# Patient Record
Sex: MALE | Race: WHITE | NOT HISPANIC OR LATINO | Employment: FULL TIME | URBAN - METROPOLITAN AREA
[De-identification: names, ages, dates, MRNs, and addresses within clinical notes are randomized per-mention and may not be internally consistent; named-entity substitution may affect disease eponyms.]

---

## 2020-08-20 ENCOUNTER — OFFICE VISIT (OUTPATIENT)
Dept: FAMILY MEDICINE | Facility: CLINIC | Age: 40
End: 2020-08-20
Payer: COMMERCIAL

## 2020-08-20 VITALS
BODY MASS INDEX: 32.41 KG/M2 | OXYGEN SATURATION: 98 % | HEIGHT: 71 IN | TEMPERATURE: 98 F | DIASTOLIC BLOOD PRESSURE: 91 MMHG | HEART RATE: 82 BPM | WEIGHT: 231.5 LBS | SYSTOLIC BLOOD PRESSURE: 126 MMHG

## 2020-08-20 DIAGNOSIS — Z00.01 ANNUAL VISIT FOR GENERAL ADULT MEDICAL EXAMINATION WITH ABNORMAL FINDINGS: ICD-10-CM

## 2020-08-20 DIAGNOSIS — B35.3 TINEA PEDIS OF BOTH FEET: ICD-10-CM

## 2020-08-20 DIAGNOSIS — Z00.00 WELLNESS EXAMINATION: Primary | ICD-10-CM

## 2020-08-20 DIAGNOSIS — Z79.899 ENCOUNTER FOR LONG-TERM CURRENT USE OF MEDICATION: ICD-10-CM

## 2020-08-20 DIAGNOSIS — R53.83 FATIGUE, UNSPECIFIED TYPE: ICD-10-CM

## 2020-08-20 DIAGNOSIS — M25.50 ARTHRALGIA, UNSPECIFIED JOINT: ICD-10-CM

## 2020-08-20 DIAGNOSIS — R73.03 BORDERLINE DIABETES: ICD-10-CM

## 2020-08-20 DIAGNOSIS — Z00.00 ENCOUNTER FOR MEDICAL EXAMINATION TO ESTABLISH CARE: ICD-10-CM

## 2020-08-20 PROCEDURE — 3008F PR BODY MASS INDEX (BMI) DOCUMENTED: ICD-10-PCS | Mod: S$GLB,,, | Performed by: FAMILY MEDICINE

## 2020-08-20 PROCEDURE — 3008F BODY MASS INDEX DOCD: CPT | Mod: S$GLB,,, | Performed by: FAMILY MEDICINE

## 2020-08-20 PROCEDURE — 99386 PR PREVENTIVE VISIT,NEW,40-64: ICD-10-PCS | Mod: S$GLB,,, | Performed by: FAMILY MEDICINE

## 2020-08-20 PROCEDURE — 99999 PR PBB SHADOW E&M-NEW PATIENT-LVL IV: CPT | Mod: PBBFAC,,, | Performed by: FAMILY MEDICINE

## 2020-08-20 PROCEDURE — 99999 PR PBB SHADOW E&M-NEW PATIENT-LVL IV: ICD-10-PCS | Mod: PBBFAC,,, | Performed by: FAMILY MEDICINE

## 2020-08-20 PROCEDURE — 99386 PREV VISIT NEW AGE 40-64: CPT | Mod: S$GLB,,, | Performed by: FAMILY MEDICINE

## 2020-08-20 RX ORDER — ESCITALOPRAM OXALATE 10 MG/1
TABLET ORAL
COMMUNITY
Start: 2020-05-20 | End: 2020-11-19 | Stop reason: SDUPTHER

## 2020-08-20 RX ORDER — LOSARTAN POTASSIUM 50 MG/1
TABLET ORAL
COMMUNITY
Start: 2020-07-21 | End: 2020-08-25 | Stop reason: SDUPTHER

## 2020-08-20 RX ORDER — ALLOPURINOL 300 MG/1
TABLET ORAL
COMMUNITY
Start: 2020-07-21 | End: 2020-08-25 | Stop reason: SDUPTHER

## 2020-08-20 RX ORDER — DULAGLUTIDE 1.5 MG/.5ML
INJECTION, SOLUTION SUBCUTANEOUS
COMMUNITY
Start: 2020-07-22 | End: 2020-08-25 | Stop reason: SDUPTHER

## 2020-08-20 RX ORDER — PROPRANOLOL HYDROCHLORIDE 10 MG/1
TABLET ORAL
COMMUNITY
Start: 2020-05-20

## 2020-08-20 RX ORDER — ROSUVASTATIN CALCIUM 20 MG/1
TABLET, COATED ORAL
COMMUNITY
Start: 2020-07-21 | End: 2020-08-25 | Stop reason: SDUPTHER

## 2020-08-20 RX ORDER — INDOMETHACIN 50 MG/1
CAPSULE ORAL
COMMUNITY
End: 2020-12-30 | Stop reason: SDUPTHER

## 2020-08-20 RX ORDER — COLCHICINE 0.6 MG/1
CAPSULE ORAL
COMMUNITY
End: 2020-12-30 | Stop reason: SDUPTHER

## 2020-08-20 RX ORDER — METFORMIN HYDROCHLORIDE 500 MG/1
TABLET, EXTENDED RELEASE ORAL
COMMUNITY
Start: 2020-07-21 | End: 2020-08-25 | Stop reason: SDUPTHER

## 2020-08-25 ENCOUNTER — LAB VISIT (OUTPATIENT)
Dept: FAMILY MEDICINE | Facility: CLINIC | Age: 40
End: 2020-08-25
Payer: COMMERCIAL

## 2020-08-25 ENCOUNTER — APPOINTMENT (OUTPATIENT)
Dept: LAB | Facility: HOSPITAL | Age: 40
End: 2020-08-25
Attending: FAMILY MEDICINE
Payer: COMMERCIAL

## 2020-08-25 DIAGNOSIS — R73.03 BORDERLINE DIABETES: ICD-10-CM

## 2020-08-25 DIAGNOSIS — R53.83 FATIGUE, UNSPECIFIED TYPE: ICD-10-CM

## 2020-08-25 DIAGNOSIS — M25.50 ARTHRALGIA, UNSPECIFIED JOINT: ICD-10-CM

## 2020-08-25 DIAGNOSIS — Z00.00 WELLNESS EXAMINATION: ICD-10-CM

## 2020-08-25 DIAGNOSIS — Z79.899 ENCOUNTER FOR LONG-TERM CURRENT USE OF MEDICATION: ICD-10-CM

## 2020-08-25 LAB
25(OH)D3+25(OH)D2 SERPL-MCNC: 35 NG/ML (ref 30–96)
ALBUMIN SERPL BCP-MCNC: 4.6 G/DL (ref 3.5–5.2)
ALP SERPL-CCNC: 74 U/L (ref 55–135)
ALT SERPL W/O P-5'-P-CCNC: 77 U/L (ref 10–44)
ANION GAP SERPL CALC-SCNC: 11 MMOL/L (ref 8–16)
AST SERPL-CCNC: 36 U/L (ref 10–40)
BASOPHILS # BLD AUTO: 0.05 K/UL (ref 0–0.2)
BASOPHILS NFR BLD: 0.8 % (ref 0–1.9)
BILIRUB SERPL-MCNC: 0.8 MG/DL (ref 0.1–1)
BUN SERPL-MCNC: 14 MG/DL (ref 6–20)
CALCIUM SERPL-MCNC: 9.1 MG/DL (ref 8.7–10.5)
CHLORIDE SERPL-SCNC: 101 MMOL/L (ref 95–110)
CHOLEST SERPL-MCNC: 163 MG/DL (ref 120–199)
CHOLEST/HDLC SERPL: 4.4 {RATIO} (ref 2–5)
CO2 SERPL-SCNC: 24 MMOL/L (ref 23–29)
CREAT SERPL-MCNC: 1 MG/DL (ref 0.5–1.4)
DIFFERENTIAL METHOD: NORMAL
EOSINOPHIL # BLD AUTO: 0.1 K/UL (ref 0–0.5)
EOSINOPHIL NFR BLD: 2.4 % (ref 0–8)
ERYTHROCYTE [DISTWIDTH] IN BLOOD BY AUTOMATED COUNT: 12.3 % (ref 11.5–14.5)
ERYTHROCYTE [SEDIMENTATION RATE] IN BLOOD BY WESTERGREN METHOD: 5 MM/HR (ref 0–10)
EST. GFR  (AFRICAN AMERICAN): >60 ML/MIN/1.73 M^2
EST. GFR  (NON AFRICAN AMERICAN): >60 ML/MIN/1.73 M^2
ESTIMATED AVG GLUCOSE: 151 MG/DL (ref 68–131)
GLUCOSE SERPL-MCNC: 147 MG/DL (ref 70–110)
HBA1C MFR BLD HPLC: 6.9 % (ref 4.5–6.2)
HCT VFR BLD AUTO: 46.8 % (ref 40–54)
HDLC SERPL-MCNC: 37 MG/DL (ref 40–75)
HDLC SERPL: 22.7 % (ref 20–50)
HGB BLD-MCNC: 16 G/DL (ref 14–18)
IMM GRANULOCYTES # BLD AUTO: 0.02 K/UL (ref 0–0.04)
IMM GRANULOCYTES NFR BLD AUTO: 0.3 % (ref 0–0.5)
LDLC SERPL CALC-MCNC: 89 MG/DL (ref 63–159)
LYMPHOCYTES # BLD AUTO: 1.8 K/UL (ref 1–4.8)
LYMPHOCYTES NFR BLD: 30 % (ref 18–48)
MCH RBC QN AUTO: 29.4 PG (ref 27–31)
MCHC RBC AUTO-ENTMCNC: 34.2 G/DL (ref 32–36)
MCV RBC AUTO: 86 FL (ref 82–98)
MONOCYTES # BLD AUTO: 0.4 K/UL (ref 0.3–1)
MONOCYTES NFR BLD: 7.3 % (ref 4–15)
NEUTROPHILS # BLD AUTO: 3.5 K/UL (ref 1.8–7.7)
NEUTROPHILS NFR BLD: 59.2 % (ref 38–73)
NONHDLC SERPL-MCNC: 126 MG/DL
NRBC BLD-RTO: 0 /100 WBC
PLATELET # BLD AUTO: 287 K/UL (ref 150–350)
PMV BLD AUTO: 10.2 FL (ref 9.2–12.9)
POTASSIUM SERPL-SCNC: 4.4 MMOL/L (ref 3.5–5.1)
PROT SERPL-MCNC: 7.8 G/DL (ref 6–8.4)
RBC # BLD AUTO: 5.44 M/UL (ref 4.6–6.2)
SODIUM SERPL-SCNC: 136 MMOL/L (ref 136–145)
T4 FREE SERPL-MCNC: 0.68 NG/DL (ref 0.71–1.51)
TRIGL SERPL-MCNC: 185 MG/DL (ref 30–150)
TSH SERPL DL<=0.005 MIU/L-ACNC: 1.1 UIU/ML (ref 0.34–5.6)
URATE SERPL-MCNC: 8.4 MG/DL (ref 3.4–7)
VIT B12 SERPL-MCNC: 497 PG/ML (ref 210–950)
WBC # BLD AUTO: 5.93 K/UL (ref 3.9–12.7)

## 2020-08-25 PROCEDURE — 86235 NUCLEAR ANTIGEN ANTIBODY: CPT | Mod: 59

## 2020-08-25 PROCEDURE — 86038 ANTINUCLEAR ANTIBODIES: CPT

## 2020-08-25 PROCEDURE — 82607 VITAMIN B-12: CPT

## 2020-08-25 PROCEDURE — 82306 VITAMIN D 25 HYDROXY: CPT

## 2020-08-25 PROCEDURE — 84443 ASSAY THYROID STIM HORMONE: CPT

## 2020-08-25 PROCEDURE — 86431 RHEUMATOID FACTOR QUANT: CPT

## 2020-08-25 PROCEDURE — 80053 COMPREHEN METABOLIC PANEL: CPT

## 2020-08-25 PROCEDURE — 84439 ASSAY OF FREE THYROXINE: CPT

## 2020-08-25 PROCEDURE — 80061 LIPID PANEL: CPT

## 2020-08-25 PROCEDURE — 84550 ASSAY OF BLOOD/URIC ACID: CPT

## 2020-08-25 PROCEDURE — 83036 HEMOGLOBIN GLYCOSYLATED A1C: CPT

## 2020-08-25 PROCEDURE — 86039 ANTINUCLEAR ANTIBODIES (ANA): CPT

## 2020-08-25 PROCEDURE — 85651 RBC SED RATE NONAUTOMATED: CPT

## 2020-08-25 PROCEDURE — 85025 COMPLETE CBC W/AUTO DIFF WBC: CPT

## 2020-08-25 RX ORDER — LOSARTAN POTASSIUM 50 MG/1
TABLET ORAL
Qty: 90 TABLET | Refills: 3 | Status: SHIPPED | OUTPATIENT
Start: 2020-08-25 | End: 2021-04-29 | Stop reason: SDUPTHER

## 2020-08-25 RX ORDER — DULAGLUTIDE 1.5 MG/.5ML
1.5 INJECTION, SOLUTION SUBCUTANEOUS WEEKLY
Qty: 12 PEN | Refills: 3 | Status: SHIPPED | OUTPATIENT
Start: 2020-08-25 | End: 2021-07-21

## 2020-08-25 RX ORDER — METFORMIN HYDROCHLORIDE 500 MG/1
TABLET, EXTENDED RELEASE ORAL
Qty: 180 TABLET | Refills: 3 | Status: SHIPPED | OUTPATIENT
Start: 2020-08-25 | End: 2021-07-21 | Stop reason: SDUPTHER

## 2020-08-25 RX ORDER — ALLOPURINOL 300 MG/1
TABLET ORAL
Qty: 90 TABLET | Refills: 3 | Status: SHIPPED | OUTPATIENT
Start: 2020-08-25 | End: 2021-04-29 | Stop reason: SDUPTHER

## 2020-08-25 RX ORDER — ROSUVASTATIN CALCIUM 20 MG/1
TABLET, COATED ORAL
Qty: 90 TABLET | Refills: 3 | Status: SHIPPED | OUTPATIENT
Start: 2020-08-25 | End: 2021-04-29 | Stop reason: SDUPTHER

## 2020-08-26 LAB
ANA PATTERN 1: NORMAL
ANA SER QL IF: POSITIVE
ANA TITR SER IF: NORMAL {TITER}
RHEUMATOID FACT SERPL-ACNC: 12 IU/ML (ref 0–15)

## 2020-08-26 RX ORDER — CICLOPIROX OLAMINE 7.7 MG/G
CREAM TOPICAL 2 TIMES DAILY
Qty: 30 G | Refills: 2 | Status: SHIPPED | OUTPATIENT
Start: 2020-08-26 | End: 2021-07-21

## 2020-08-27 ENCOUNTER — OFFICE VISIT (OUTPATIENT)
Dept: FAMILY MEDICINE | Facility: CLINIC | Age: 40
End: 2020-08-27
Payer: COMMERCIAL

## 2020-08-27 VITALS
WEIGHT: 231 LBS | TEMPERATURE: 99 F | HEIGHT: 71 IN | DIASTOLIC BLOOD PRESSURE: 82 MMHG | HEART RATE: 90 BPM | OXYGEN SATURATION: 97 % | SYSTOLIC BLOOD PRESSURE: 122 MMHG | BODY MASS INDEX: 32.34 KG/M2 | RESPIRATION RATE: 16 BRPM

## 2020-08-27 DIAGNOSIS — R76.8 ANA POSITIVE: ICD-10-CM

## 2020-08-27 DIAGNOSIS — E11.9 DIABETES MELLITUS WITHOUT COMPLICATION: Primary | ICD-10-CM

## 2020-08-27 DIAGNOSIS — R53.83 FATIGUE, UNSPECIFIED TYPE: ICD-10-CM

## 2020-08-27 DIAGNOSIS — M10.9 GOUTY ARTHRITIS: ICD-10-CM

## 2020-08-27 DIAGNOSIS — E66.9 OBESITY (BMI 30.0-34.9): ICD-10-CM

## 2020-08-27 DIAGNOSIS — E55.9 VITAMIN D DEFICIENCY: ICD-10-CM

## 2020-08-27 DIAGNOSIS — M25.50 ARTHRALGIA, UNSPECIFIED JOINT: ICD-10-CM

## 2020-08-27 DIAGNOSIS — M11.20 PSEUDOGOUT: ICD-10-CM

## 2020-08-27 PROCEDURE — 3044F HG A1C LEVEL LT 7.0%: CPT | Mod: S$GLB,,, | Performed by: FAMILY MEDICINE

## 2020-08-27 PROCEDURE — 3044F PR MOST RECENT HEMOGLOBIN A1C LEVEL <7.0%: ICD-10-PCS | Mod: S$GLB,,, | Performed by: FAMILY MEDICINE

## 2020-08-27 PROCEDURE — 99999 PR PBB SHADOW E&M-EST. PATIENT-LVL IV: ICD-10-PCS | Mod: PBBFAC,,, | Performed by: FAMILY MEDICINE

## 2020-08-27 PROCEDURE — 3008F PR BODY MASS INDEX (BMI) DOCUMENTED: ICD-10-PCS | Mod: S$GLB,,, | Performed by: FAMILY MEDICINE

## 2020-08-27 PROCEDURE — 99999 PR PBB SHADOW E&M-EST. PATIENT-LVL IV: CPT | Mod: PBBFAC,,, | Performed by: FAMILY MEDICINE

## 2020-08-27 PROCEDURE — 96372 PR INJECTION,THERAP/PROPH/DIAG2ST, IM OR SUBCUT: ICD-10-PCS | Mod: S$GLB,,, | Performed by: FAMILY MEDICINE

## 2020-08-27 PROCEDURE — 99215 OFFICE O/P EST HI 40 MIN: CPT | Mod: 25,S$GLB,, | Performed by: FAMILY MEDICINE

## 2020-08-27 PROCEDURE — 99215 PR OFFICE/OUTPT VISIT, EST, LEVL V, 40-54 MIN: ICD-10-PCS | Mod: 25,S$GLB,, | Performed by: FAMILY MEDICINE

## 2020-08-27 PROCEDURE — 96372 THER/PROPH/DIAG INJ SC/IM: CPT | Mod: S$GLB,,, | Performed by: FAMILY MEDICINE

## 2020-08-27 PROCEDURE — 3008F BODY MASS INDEX DOCD: CPT | Mod: S$GLB,,, | Performed by: FAMILY MEDICINE

## 2020-08-27 RX ORDER — ASPIRIN 325 MG
50000 TABLET, DELAYED RELEASE (ENTERIC COATED) ORAL WEEKLY
Qty: 12 CAPSULE | Refills: 0 | Status: SHIPPED | OUTPATIENT
Start: 2020-08-27 | End: 2020-11-13

## 2020-08-27 RX ORDER — CYANOCOBALAMIN 1000 UG/ML
100 INJECTION, SOLUTION INTRAMUSCULAR; SUBCUTANEOUS
Status: COMPLETED | OUTPATIENT
Start: 2020-08-27 | End: 2020-08-27

## 2020-08-27 RX ADMIN — CYANOCOBALAMIN 100 MCG: 1000 INJECTION, SOLUTION INTRAMUSCULAR; SUBCUTANEOUS at 05:08

## 2020-08-27 NOTE — PROGRESS NOTES
Subjective:         Patient ID: Butch Meneses is a 40 y.o. male.    Chief Complaint: Follow-up      Here for follow up on lab results.    ESR 5  FANNY screen +  FANNY Titer 1 1:160  FANNY Pattern 1 Homogenous  Rh factor 12 (wnl)  B12 497  Uric acid 8.4 (high)  Vit D 35  TSH 1.100  F T4 0.68 (low)    Due for UA and urine microalb/creatinine and FIT test.        CBC:   Lab Results   Component Value Date    WBC 5.93 08/25/2020    HGB 16.0 08/25/2020    HCT 46.8 08/25/2020     08/25/2020    MCV 86 08/25/2020    RDW 12.3 08/25/2020     CMP:   Lab Results   Component Value Date     08/25/2020    K 4.4 08/25/2020     08/25/2020    CO2 24 08/25/2020    BUN 14 08/25/2020    CREATININE 1.0 08/25/2020     (H) 08/25/2020    CALCIUM 9.1 08/25/2020    ALKPHOS 74 08/25/2020    PROT 7.8 08/25/2020    ALBUMIN 4.6 08/25/2020    BILITOT 0.8 08/25/2020    AST 36 08/25/2020    ALT 77 (H) 08/25/2020     LIPIDS:   Lab Results   Component Value Date    CHOL 163 08/25/2020    HDL 37 (L) 08/25/2020    LDLCALC 89.0 08/25/2020    TRIG 185 (H) 08/25/2020    CHOLHDL 22.7 08/25/2020     HA1C:   Lab Results   Component Value Date    HGBA1C 6.9 (H) 08/25/2020     TSH:   Lab Results   Component Value Date    TSH 1.100 08/25/2020       Review of Systems   Constitutional: Negative for activity change, appetite change, fatigue, fever and unexpected weight change.   HENT: Negative for congestion, hearing loss, rhinorrhea, sinus pressure and trouble swallowing.    Eyes: Negative for discharge and visual disturbance.   Respiratory: Negative for cough, chest tightness, shortness of breath and wheezing.    Cardiovascular: Negative for chest pain and palpitations.   Gastrointestinal: Negative for abdominal pain, blood in stool, constipation, diarrhea, nausea and vomiting.   Endocrine: Negative for polydipsia, polyphagia and polyuria.   Genitourinary: Negative for difficulty urinating, dysuria, frequency, hematuria and urgency.    Musculoskeletal: Negative for arthralgias, joint swelling, myalgias and neck pain.   Skin: Negative for rash.   Allergic/Immunologic: Negative for environmental allergies.   Neurological: Negative for dizziness, weakness and headaches.   Hematological: Negative for adenopathy.   Psychiatric/Behavioral: Negative for confusion, dysphoric mood and sleep disturbance.   All other systems reviewed and are negative.        Past Medical History:   Diagnosis Date    Anxiety     Arthritis     Depression     Diabetes mellitus, type 2     pt takes meds. sugar is borderline    Gout     Hyperlipidemia     Pseudogout      Past Surgical History:   Procedure Laterality Date    achillies      rupture     Family History   Problem Relation Age of Onset    Alcohol abuse Father     Cancer Father     Depression Father     Diabetes Father     Drug abuse Father     Heart disease Father     Hyperlipidemia Father     Hypertension Father     Mental illness Father     Arthritis Sister     Depression Sister     Drug abuse Sister     Hyperlipidemia Sister     Hypertension Sister     Mental illness Sister     Alcohol abuse Maternal Aunt     Drug abuse Maternal Aunt     Alcohol abuse Maternal Uncle     Drug abuse Maternal Uncle     Hyperlipidemia Maternal Uncle     Hypertension Maternal Uncle     Cancer Maternal Uncle         kidney cancer    Hyperlipidemia Paternal Uncle     Drug abuse Maternal Grandfather     Early death Maternal Grandfather 64    Heart disease Maternal Grandfather     Hypertension Maternal Grandfather     Arthritis Paternal Grandmother     Early death Paternal Grandmother 63    Stroke Paternal Grandmother     Pulmonary embolism Paternal Grandmother     Arthritis Paternal Grandfather     Cancer Paternal Grandfather         kidney cancer    Kidney disease Paternal Grandfather        Review of patient's allergies indicates:   Allergen Reactions    Vicryl [sutures, polyglycolic acid]  Other (See Comments)     Body rejected vicryl sutures due to allergy--had wound dehiscence       Current Outpatient Medications:     allopurinoL (ZYLOPRIM) 300 MG tablet, Take 1 tablet PO daily for gout prevention., Disp: 90 tablet, Rfl: 3    cholecalciferol, vitamin D3, (DECARA) 1,250 mcg (50,000 unit) capsule, Take 1 capsule (50,000 Units total) by mouth once a week. Take with a meal or with a spoonful of peanut butter for low vitamin D. for 12 doses, Disp: 12 capsule, Rfl: 0    ciclopirox (LOPROX) 0.77 % Crea, Apply topically 2 (two) times daily. For athlete's feet. for 14 days, Disp: 30 g, Rfl: 2    colchicine (MITIGARE) 0.6 mg Cap, Take by mouth., Disp: , Rfl:     escitalopram oxalate (LEXAPRO) 10 MG tablet, TK 1 T PO BID, Disp: , Rfl:     indomethacin (INDOCIN) 50 MG capsule, Take by mouth., Disp: , Rfl:     losartan (COZAAR) 50 MG tablet, Take 1 ablet PO daily  TO PROTECT KIDNEYS, Disp: 90 tablet, Rfl: 3    metFORMIN (GLUCOPHAGE-XR) 500 MG ER 24hr tablet, Take 2 tablets PO daily with a meal  FOR DIABETES, Disp: 180 tablet, Rfl: 3    propranoloL (INDERAL) 10 MG tablet, TK 1 T PO BID, Disp: , Rfl:     rosuvastatin (CRESTOR) 20 MG tablet, Take 1 tablet PO daily  FOR CHOLESTEROL, Disp: 90 tablet, Rfl: 3    TRULICITY 1.5 mg/0.5 mL pen injector, Inject 1.5 mg into the skin once a week., Disp: 12 pen, Rfl: 3      Objective:      Temp 98.6 °F (37 °C)   Wt 104.8 kg (231 lb)   BMI 32.22 kg/m²   Physical Exam  Vitals signs and nursing note reviewed.   Constitutional:       General: He is not in acute distress.     Appearance: Normal appearance. He is well-developed. He is not ill-appearing, toxic-appearing or diaphoretic.   HENT:      Head: Normocephalic and atraumatic.      Right Ear: External ear normal.      Left Ear: External ear normal.      Nose: Nose normal.      Mouth/Throat:      Pharynx: No oropharyngeal exudate.   Eyes:      General: No scleral icterus.        Right eye: No discharge.          Left eye: No discharge.      Extraocular Movements: Extraocular movements intact.      Conjunctiva/sclera: Conjunctivae normal.      Pupils: Pupils are equal, round, and reactive to light.   Neck:      Musculoskeletal: Normal range of motion and neck supple.      Thyroid: No thyromegaly.      Vascular: No JVD.      Trachea: No tracheal deviation.   Cardiovascular:      Rate and Rhythm: Normal rate and regular rhythm.      Pulses: Normal pulses.      Heart sounds: Normal heart sounds. No murmur. No friction rub. No gallop.    Pulmonary:      Effort: Pulmonary effort is normal. No respiratory distress.      Breath sounds: Normal breath sounds. No wheezing, rhonchi or rales.   Abdominal:      General: Bowel sounds are normal.      Palpations: Abdomen is soft.   Musculoskeletal: Normal range of motion.         General: No deformity.      Right lower leg: No edema.      Left lower leg: No edema.   Skin:     General: Skin is warm and dry.      Capillary Refill: Capillary refill takes less than 2 seconds.      Coloration: Skin is not jaundiced or pale.      Findings: No erythema or rash.   Neurological:      General: No focal deficit present.      Mental Status: He is alert and oriented to person, place, and time. Mental status is at baseline.      Motor: No weakness.      Coordination: Coordination normal.      Gait: Gait normal.   Psychiatric:         Mood and Affect: Mood normal.         Behavior: Behavior normal.         Thought Content: Thought content normal.         Judgment: Judgment normal.         Assessment:       1. Diabetes mellitus without complication    2. Fatigue, unspecified type    3. Arthralgia, unspecified joint    4. Vitamin D deficiency    5. FANNY positive    6. Gouty arthritis    7. Pseudogout    8. Obesity (BMI 30.0-34.9)        Plan:       Diabetes mellitus without complication    Fatigue, unspecified type    Arthralgia, unspecified joint  -     Ambulatory referral/consult to Rheumatology; Future;  Expected date: 09/03/2020    Vitamin D deficiency  -     cholecalciferol, vitamin D3, (DECARA) 1,250 mcg (50,000 unit) capsule; Take 1 capsule (50,000 Units total) by mouth once a week. Take with a meal or with a spoonful of peanut butter for low vitamin D. for 12 doses  Dispense: 12 capsule; Refill: 0    FANNY positive  -     Ambulatory referral/consult to Rheumatology; Future; Expected date: 09/03/2020    Gouty arthritis  -     Magnesium; Future; Expected date: 08/27/2020  -     PHOSPHORUS; Future; Expected date: 08/27/2020    Pseudogout  -     Ambulatory referral/consult to Rheumatology; Future; Expected date: 09/03/2020    Obesity (BMI 30.0-34.9)      Goal of A1C 6.5 in 3 months with dietary changes and increased physical activity.  1 hour per week of physical activity.        Strict return precautions reviewed and patient verbalized understanding. Risks, benefits, and alternatives to the plan were reviewed in detail and all questions answered to the patient's satisfaction. 45 minutes were spent with patient, >50% of time based on counseling and coordination of care.    Follow up in about 3 months (around 11/27/2020) for labs prior and follow up.

## 2020-08-27 NOTE — PATIENT INSTRUCTIONS
Nature Made ULTRA [place] Fish Oil. 2 capsules daily (burpless!)    Co Q10 300mg daily    Magnesium glycinate 1000-2000mg daily    Methyl B12 aka methylcobalamin 2000mcg sublingual daily (Bertha on [employer])    Rx Vit D    B complex daily (Nature Made)    Whole 30 diet/grain free/Paleo or similar

## 2020-08-27 NOTE — Clinical Note
Mail a PQH-(/MARTIN-7 form to patient please. Message him when it has been done, he can take picture of each side and upload with message in [employer].

## 2020-09-01 LAB
ANTI SM ANTIBODY: 0.07 RATIO (ref 0–0.99)
ANTI SM/RNP ANTIBODY: 0.08 RATIO (ref 0–0.99)
ANTI-SM INTERPRETATION: NEGATIVE
ANTI-SM/RNP INTERPRETATION: NEGATIVE
ANTI-SSA ANTIBODY: 0.1 RATIO (ref 0–0.99)
ANTI-SSA INTERPRETATION: NEGATIVE
ANTI-SSB ANTIBODY: 0.29 RATIO (ref 0–0.99)
ANTI-SSB INTERPRETATION: NEGATIVE
DSDNA AB SER-ACNC: NORMAL [IU]/ML

## 2020-09-10 ENCOUNTER — CLINICAL SUPPORT (OUTPATIENT)
Dept: FAMILY MEDICINE | Facility: CLINIC | Age: 40
End: 2020-09-10
Payer: COMMERCIAL

## 2020-09-10 PROCEDURE — 99999 PR PBB SHADOW E&M-EST. PATIENT-LVL III: ICD-10-PCS | Mod: PBBFAC,,,

## 2020-09-10 PROCEDURE — 99999 PR PBB SHADOW E&M-EST. PATIENT-LVL III: CPT | Mod: PBBFAC,,,

## 2020-09-14 RX ORDER — CYANOCOBALAMIN 1000 UG/ML
1000 INJECTION, SOLUTION INTRAMUSCULAR; SUBCUTANEOUS
Status: CANCELLED | OUTPATIENT
Start: 2020-09-14

## 2020-09-15 RX ORDER — CYANOCOBALAMIN 1000 UG/ML
1000 INJECTION, SOLUTION INTRAMUSCULAR; SUBCUTANEOUS
Qty: 3 ML | Refills: 3 | Status: SHIPPED | OUTPATIENT
Start: 2020-09-15 | End: 2021-09-15

## 2020-09-17 NOTE — PROGRESS NOTES
Pt present to clinic on 9/10/2017 for B12 injection per provider orders. Pt states name, , allergies. 22g 1in needle used. Medication given in Right Deltoid, bandaid applied, voices no complaints. Lot number [id number], Exp 2022, NDC 44070-680-12 [facility]

## 2020-09-18 ENCOUNTER — PATIENT MESSAGE (OUTPATIENT)
Dept: FAMILY MEDICINE | Facility: CLINIC | Age: 40
End: 2020-09-18

## 2020-09-18 RX ORDER — BUPROPION HYDROCHLORIDE 150 MG/1
150 TABLET ORAL DAILY
Qty: 30 TABLET | Refills: 11 | Status: SHIPPED | OUTPATIENT
Start: 2020-09-18 | End: 2021-04-29 | Stop reason: SDUPTHER

## 2020-10-11 ENCOUNTER — PATIENT MESSAGE (OUTPATIENT)
Dept: FAMILY MEDICINE | Facility: CLINIC | Age: 40
End: 2020-10-11

## 2020-10-11 DIAGNOSIS — R06.81 APNEA: Primary | ICD-10-CM

## 2020-10-11 DIAGNOSIS — R53.83 OTHER FATIGUE: ICD-10-CM

## 2020-10-13 ENCOUNTER — DOCUMENTATION ONLY (OUTPATIENT)
Dept: FAMILY MEDICINE | Facility: CLINIC | Age: 40
End: 2020-10-13

## 2020-10-13 NOTE — PROGRESS NOTES
"Faxed paperwork to [facility] for sleep study to be performed.   Confirmation received.    Your fax has been successfully sent to [phone] at [phone].  ------------------------------------------------------------  From: [id number]  ------------------------------------------------------------    10/13/2020 10:45:22 AM Transmission Record          Sent to [id number] with remote ID "[facility]"          Result: (0/339;0/0) Success          Page record: 1 - 3          Elapsed time: 02:04 on channel 32  "

## 2020-10-13 NOTE — TELEPHONE ENCOUNTER
Sleep study ordered. Please explain the requirement for sleep study to determine what type of CPAP, appropriate settings, etc. There are forms for local sleep medicine company--they will convert to cpap at the time of the study if pt meets criteria. I will need to manually sign. Please fill out and after I sign, fax to the company.

## 2020-11-05 ENCOUNTER — PATIENT MESSAGE (OUTPATIENT)
Dept: FAMILY MEDICINE | Facility: CLINIC | Age: 40
End: 2020-11-05

## 2020-11-19 ENCOUNTER — OFFICE VISIT (OUTPATIENT)
Dept: FAMILY MEDICINE | Facility: CLINIC | Age: 40
End: 2020-11-19
Payer: COMMERCIAL

## 2020-11-19 VITALS
OXYGEN SATURATION: 99 % | DIASTOLIC BLOOD PRESSURE: 90 MMHG | RESPIRATION RATE: 15 BRPM | SYSTOLIC BLOOD PRESSURE: 137 MMHG | HEIGHT: 71 IN | HEART RATE: 82 BPM | WEIGHT: 239.19 LBS | BODY MASS INDEX: 33.48 KG/M2

## 2020-11-19 DIAGNOSIS — F32.0 CURRENT MILD EPISODE OF MAJOR DEPRESSIVE DISORDER WITHOUT PRIOR EPISODE: ICD-10-CM

## 2020-11-19 DIAGNOSIS — E11.9 TYPE 2 DIABETES MELLITUS WITHOUT COMPLICATION, WITHOUT LONG-TERM CURRENT USE OF INSULIN: Primary | ICD-10-CM

## 2020-11-19 DIAGNOSIS — E53.8 B12 DEFICIENCY: ICD-10-CM

## 2020-11-19 PROCEDURE — 3008F PR BODY MASS INDEX (BMI) DOCUMENTED: ICD-10-PCS | Mod: S$GLB,,, | Performed by: FAMILY MEDICINE

## 2020-11-19 PROCEDURE — 1126F AMNT PAIN NOTED NONE PRSNT: CPT | Mod: S$GLB,,, | Performed by: FAMILY MEDICINE

## 2020-11-19 PROCEDURE — 1126F PR PAIN SEVERITY QUANTIFIED, NO PAIN PRESENT: ICD-10-PCS | Mod: S$GLB,,, | Performed by: FAMILY MEDICINE

## 2020-11-19 PROCEDURE — 3044F HG A1C LEVEL LT 7.0%: CPT | Mod: S$GLB,,, | Performed by: FAMILY MEDICINE

## 2020-11-19 PROCEDURE — 99214 OFFICE O/P EST MOD 30 MIN: CPT | Mod: S$GLB,,, | Performed by: FAMILY MEDICINE

## 2020-11-19 PROCEDURE — 3008F BODY MASS INDEX DOCD: CPT | Mod: S$GLB,,, | Performed by: FAMILY MEDICINE

## 2020-11-19 PROCEDURE — 99999 PR PBB SHADOW E&M-EST. PATIENT-LVL IV: ICD-10-PCS | Mod: PBBFAC,,, | Performed by: FAMILY MEDICINE

## 2020-11-19 PROCEDURE — 3044F PR MOST RECENT HEMOGLOBIN A1C LEVEL <7.0%: ICD-10-PCS | Mod: S$GLB,,, | Performed by: FAMILY MEDICINE

## 2020-11-19 PROCEDURE — 99999 PR PBB SHADOW E&M-EST. PATIENT-LVL IV: CPT | Mod: PBBFAC,,, | Performed by: FAMILY MEDICINE

## 2020-11-19 PROCEDURE — 99214 PR OFFICE/OUTPT VISIT, EST, LEVL IV, 30-39 MIN: ICD-10-PCS | Mod: S$GLB,,, | Performed by: FAMILY MEDICINE

## 2020-11-19 RX ORDER — CYANOCOBALAMIN 1000 UG/ML
1000 INJECTION, SOLUTION INTRAMUSCULAR; SUBCUTANEOUS ONCE
Status: COMPLETED | OUTPATIENT
Start: 2020-11-19 | End: 2020-12-11

## 2020-11-19 RX ORDER — SEMAGLUTIDE 1.34 MG/ML
0.25 INJECTION, SOLUTION SUBCUTANEOUS
Qty: 3 ML | Refills: 5 | Status: SHIPPED | OUTPATIENT
Start: 2020-11-19 | End: 2020-12-19

## 2020-11-19 RX ORDER — ESCITALOPRAM OXALATE 20 MG/1
20 TABLET ORAL DAILY
Qty: 90 TABLET | Refills: 3 | Status: SHIPPED | OUTPATIENT
Start: 2020-11-19 | End: 2021-02-24

## 2020-11-19 NOTE — PROGRESS NOTES
"  Subjective:       Patient ID: Butch Meneses is a 40 y.o. male.    Chief Complaint: Follow-up    In regards to the patient's diabetes, patient denies hypoglycemic symptoms or any symptoms of fluctuating blood glucose. Not checking FSG daily, or on a regular basis. States he did really well on a "clean diet" but found it just too hard to stick to 100% of the time. Since then, he has "gone off the wagon completely" and eating junk. Wants to get back into low carb or even keto WOE.    Patient reports that depression is not fully controlled. Denies SI/HI. Not sleeping well.     Weight gain, occasional morning HA, low energy, low libido, difficulty concentrating, snores, excessive daytime fatigue--has not had a sleep study in the past but is now willing.        Review of Systems   All other systems reviewed and are negative.        Past Medical History:   Diagnosis Date    Anxiety     Arthritis     Depression     Diabetes mellitus, type 2     pt takes meds. sugar is borderline    Gout     Hyperlipidemia     Pseudogout      Past Surgical History:   Procedure Laterality Date    achillies      rupture     Family History   Problem Relation Age of Onset    Alcohol abuse Father     Cancer Father     Depression Father     Diabetes Father     Drug abuse Father     Heart disease Father     Hyperlipidemia Father     Hypertension Father     Mental illness Father     Arthritis Sister     Depression Sister     Drug abuse Sister     Hyperlipidemia Sister     Hypertension Sister     Mental illness Sister     Alcohol abuse Maternal Aunt     Drug abuse Maternal Aunt     Alcohol abuse Maternal Uncle     Drug abuse Maternal Uncle     Hyperlipidemia Maternal Uncle     Hypertension Maternal Uncle     Cancer Maternal Uncle         kidney cancer    Hyperlipidemia Paternal Uncle     Drug abuse Maternal Grandfather     Early death Maternal Grandfather 64    Heart disease Maternal Grandfather     Hypertension " Maternal Grandfather     Arthritis Paternal Grandmother     Early death Paternal Grandmother 63    Stroke Paternal Grandmother     Pulmonary embolism Paternal Grandmother     Arthritis Paternal Grandfather     Cancer Paternal Grandfather         kidney cancer    Kidney disease Paternal Grandfather        Review of patient's allergies indicates:   Allergen Reactions    Vicryl [sutures, polyglycolic acid] Other (See Comments)     Body rejected vicryl sutures due to allergy--had wound dehiscence       Current Outpatient Medications:     allopurinoL (ZYLOPRIM) 300 MG tablet, Take 1 tablet PO daily for gout prevention., Disp: 90 tablet, Rfl: 3    buPROPion (WELLBUTRIN XL) 150 MG TB24 tablet, Take 1 tablet (150 mg total) by mouth once daily., Disp: 30 tablet, Rfl: 11    escitalopram oxalate (LEXAPRO) 20 MG tablet, Take 1 tablet (20 mg total) by mouth once daily., Disp: 90 tablet, Rfl: 3    losartan (COZAAR) 50 MG tablet, Take 1 ablet PO daily  TO PROTECT KIDNEYS, Disp: 90 tablet, Rfl: 3    metFORMIN (GLUCOPHAGE-XR) 500 MG ER 24hr tablet, Take 2 tablets PO daily with a meal  FOR DIABETES, Disp: 180 tablet, Rfl: 3    propranoloL (INDERAL) 10 MG tablet, TK 1 T PO BID, Disp: , Rfl:     rosuvastatin (CRESTOR) 20 MG tablet, Take 1 tablet PO daily  FOR CHOLESTEROL, Disp: 90 tablet, Rfl: 3    TRULICITY 1.5 mg/0.5 mL pen injector, Inject 1.5 mg into the skin once a week., Disp: 12 pen, Rfl: 3    ciclopirox (LOPROX) 0.77 % Crea, Apply topically 2 (two) times daily. For athlete's feet. for 14 days, Disp: 30 g, Rfl: 2    colchicine (MITIGARE) 0.6 mg Cap, Take 2 tablets immediately at onset of gout flare, then 1 tablet an hour later. The next day, you may start taking 1 tablet twice daily as needed for gout flare., Disp: 60 capsule, Rfl: 2    cyanocobalamin 1,000 mcg/mL injection, Inject 1 mL (1,000 mcg total) into the muscle every 28 days. (Patient not taking: Reported on 11/19/2020), Disp: 3 mL, Rfl: 3     "indomethacin (INDOCIN) 50 MG capsule, Take 1 capsule (50 mg total) by mouth every meal as needed (pain)., Disp: 90 capsule, Rfl: 0    vortioxetine (TRINTELLIX) 10 mg Tab, Take 1 tablet (10 mg total) by mouth once daily. For anxiety., Disp: 90 tablet, Rfl: 3      Objective:      BP (!) 137/90 (BP Location: Left arm, Patient Position: Sitting, BP Method: Large (Automatic))   Pulse 82   Resp 15   Ht 5' 11" (1.803 m)   Wt 108.5 kg (239 lb 3.2 oz)   SpO2 99%   BMI 33.36 kg/m²   Physical Exam  Vitals signs and nursing note reviewed.   Constitutional:       General: He is not in acute distress.     Appearance: Normal appearance. He is obese. He is not ill-appearing, toxic-appearing or diaphoretic.   HENT:      Head: Normocephalic and atraumatic.      Right Ear: External ear normal.      Left Ear: External ear normal.      Nose: Nose normal.      Mouth/Throat:      Mouth: Mucous membranes are moist.      Pharynx: Oropharynx is clear. No oropharyngeal exudate.   Eyes:      General: No scleral icterus.        Right eye: No discharge.         Left eye: No discharge.      Extraocular Movements: Extraocular movements intact.      Conjunctiva/sclera: Conjunctivae normal.      Pupils: Pupils are equal, round, and reactive to light.   Neck:      Musculoskeletal: Normal range of motion and neck supple. No neck rigidity or muscular tenderness.      Vascular: No carotid bruit.   Cardiovascular:      Rate and Rhythm: Normal rate and regular rhythm.      Pulses: Normal pulses.      Heart sounds: Normal heart sounds. No murmur. No friction rub. No gallop.    Pulmonary:      Effort: Pulmonary effort is normal. No respiratory distress.      Breath sounds: Normal breath sounds. No wheezing, rhonchi or rales.   Abdominal:      General: Abdomen is flat.      Palpations: Abdomen is soft.   Musculoskeletal: Normal range of motion.         General: No swelling or tenderness.      Right lower leg: No edema.      Left lower leg: No edema. "   Lymphadenopathy:      Cervical: No cervical adenopathy.   Skin:     General: Skin is warm and dry.      Capillary Refill: Capillary refill takes less than 2 seconds.      Coloration: Skin is not jaundiced or pale.      Findings: No erythema, lesion or rash.   Neurological:      General: No focal deficit present.      Mental Status: He is alert and oriented to person, place, and time. Mental status is at baseline.      Motor: No weakness.      Coordination: Coordination normal.      Gait: Gait normal.      Deep Tendon Reflexes: Reflexes normal.   Psychiatric:         Mood and Affect: Mood normal.         Behavior: Behavior normal.         Thought Content: Thought content normal.         Judgment: Judgment normal.         Assessment:       1. Type 2 diabetes mellitus without complication, without long-term current use of insulin    2. Current mild episode of major depressive disorder without prior episode    3. B12 deficiency        Plan:       Type 2 diabetes mellitus without complication, without long-term current use of insulin  -     semaglutide (OZEMPIC) 0.25 mg or 0.5 mg(2 mg/1.5 mL) PnIj; Inject 0.25 mg into the skin every 7 days.  Dispense: 3 mL; Refill: 5    Current mild episode of major depressive disorder without prior episode  -     escitalopram oxalate (LEXAPRO) 20 MG tablet; Take 1 tablet (20 mg total) by mouth once daily.  Dispense: 90 tablet; Refill: 3    B12 deficiency  -     cyanocobalamin injection 1,000 mcg    Refer for sleep study.  Check FSG at least once daily.  F/u 4 weeks.  Will probably need PA for the Ozempic--family member has done really well on it and he is hopeful.        Follow up in about 4 weeks (around 12/17/2020) for f/u DM, elevated BP.

## 2020-11-30 ENCOUNTER — PATIENT MESSAGE (OUTPATIENT)
Dept: FAMILY MEDICINE | Facility: CLINIC | Age: 40
End: 2020-11-30

## 2020-12-04 DIAGNOSIS — E11.9 TYPE 2 DIABETES MELLITUS WITHOUT COMPLICATION, UNSPECIFIED WHETHER LONG TERM INSULIN USE: ICD-10-CM

## 2020-12-10 ENCOUNTER — TELEPHONE (OUTPATIENT)
Dept: FAMILY MEDICINE | Facility: CLINIC | Age: 40
End: 2020-12-10

## 2020-12-10 NOTE — TELEPHONE ENCOUNTER
Please contact [facility]; patient states they were supposed to fax over his CPAP orders. Not sure if the sleep medicine doctor was prescribing the CPAP and supplies, but [employer] need to get this set up ASAP due to severe ARLEEN.

## 2020-12-11 ENCOUNTER — CLINICAL SUPPORT (OUTPATIENT)
Dept: FAMILY MEDICINE | Facility: CLINIC | Age: 40
End: 2020-12-11
Payer: COMMERCIAL

## 2020-12-11 PROCEDURE — 96372 PR INJECTION,THERAP/PROPH/DIAG2ST, IM OR SUBCUT: ICD-10-PCS | Mod: S$GLB,,, | Performed by: FAMILY MEDICINE

## 2020-12-11 PROCEDURE — 99999 PR PBB SHADOW E&M-EST. PATIENT-LVL I: CPT | Mod: PBBFAC,,,

## 2020-12-11 PROCEDURE — 96372 THER/PROPH/DIAG INJ SC/IM: CPT | Mod: S$GLB,,, | Performed by: FAMILY MEDICINE

## 2020-12-11 PROCEDURE — 99999 PR PBB SHADOW E&M-EST. PATIENT-LVL I: ICD-10-PCS | Mod: PBBFAC,,,

## 2020-12-11 RX ADMIN — CYANOCOBALAMIN 1000 MCG: 1000 INJECTION, SOLUTION INTRAMUSCULAR; SUBCUTANEOUS at 11:12

## 2020-12-16 ENCOUNTER — TELEPHONE (OUTPATIENT)
Dept: FAMILY MEDICINE | Facility: CLINIC | Age: 40
End: 2020-12-16

## 2020-12-16 ENCOUNTER — PATIENT MESSAGE (OUTPATIENT)
Dept: FAMILY MEDICINE | Facility: CLINIC | Age: 40
End: 2020-12-16

## 2020-12-16 NOTE — TELEPHONE ENCOUNTER
----- Message from Apurva  sent at 12/16/2020  9:22 AM CST -----  Regarding: orders  Contact: Wilberto from [facility]  Type: Needs Medical Advice    Who Called:      Best Call Back Number: [phone]    Additional Information: Requesting a call back from Nurse, regarding detailed written order sent over on 12/08 12/11 still has not be signed and faxed back office needs this in order for pt to receive CPAP machine, she has faxed over another today  ,please call her back     Fax number: [phone]

## 2020-12-17 ENCOUNTER — PATIENT MESSAGE (OUTPATIENT)
Dept: FAMILY MEDICINE | Facility: CLINIC | Age: 40
End: 2020-12-17

## 2020-12-18 ENCOUNTER — PATIENT MESSAGE (OUTPATIENT)
Dept: FAMILY MEDICINE | Facility: CLINIC | Age: 40
End: 2020-12-18

## 2020-12-18 ENCOUNTER — TELEPHONE (OUTPATIENT)
Dept: FAMILY MEDICINE | Facility: CLINIC | Age: 40
End: 2020-12-18

## 2020-12-18 RX ORDER — VORTIOXETINE 10 MG/1
10 TABLET, FILM COATED ORAL DAILY
Qty: 90 TABLET | Refills: 3 | Status: SHIPPED | OUTPATIENT
Start: 2020-12-18 | End: 2021-02-24

## 2020-12-18 NOTE — TELEPHONE ENCOUNTER
Needs PA ASAP on Trintellix. Dx of anxiety and depression and sexual dysfunction. The sexual dysfunction typically will get the med to go through.    He has failed Lexapro and others. Might have to get with patient to see what all he has tried in the past. (If not noted in his last office visits.) Those will ALL need to be listed on the PA.

## 2020-12-18 NOTE — TELEPHONE ENCOUNTER
----- Message from Heri Tai sent at 12/17/2020  3:04 PM CST -----  Regarding: Order for CPAP  Type: Needs Medical Advice    Who Called:  Ptnt  [phone]    Additional Information:     Advised F/U on order for CPAP with [facility], Yon needs a CMN for the order.    Please advise.

## 2020-12-18 NOTE — TELEPHONE ENCOUNTER
----- Message from Heri Tai sent at 12/17/2020  1:42 PM CST -----  Regarding: CPAP order  Type: Needs Medical Advice    Who Called:  [facility]  [phone]    Additional Information:     Advised F/U order for CPAP faxed to office on 12-08,11,.     Please call.

## 2020-12-18 NOTE — TELEPHONE ENCOUNTER
CPAP orders sent to [facility] with confirmation. Spoke with April with [facility] to confirm.

## 2020-12-23 NOTE — TELEPHONE ENCOUNTER
Attempted to contact [facility] to initiate a prior authorization on December 22 and December 23. ([phone])  states offices are currently closed. Will attempt to call again 12/24.

## 2020-12-24 NOTE — PROGRESS NOTES
RHEUMATOLOGY CLINIC INITIAL VISIT    Reason for referral:-  Referred for evaluation of arthritis     Chief complaints:- joint pain       HPI:-  Butch Rai a 40 y.o. pleasant male with PMH of gout (crystal proven), HTN, HLPD, DMII, and depression comes in for an initial visit with above chief complaints.     Patient complaining of R knee pain that started a couple days ago.  Localized to the medial aspect.  Constant dull pain.  Movement worsen the pain - feels like a sharp pain.  Inactivity makes it worse.  AM stiffness - none.  Celebrex alleviated some of the symptoms.     Patient was diagnosed with gout 2015 - symptoms develop in the R 1st MTP and ankle (occasionally on the L side).  Patient was having frequent attacks.  Had joint aspirate of the R knee (due to frequent joint swelling).  Found to have uric crystals.  Was initially started on febuxostat but kept on having frequent attacks.  Was switched on allopurinol - which attacks had subsequently decreased.  Had not many attacks until recently when patient started to jog again.  Flares alleviated with usage of colchicine and indomethacin.      Fhx: Sister with RA (had bilateral knee replacements already).  Mother and grandmother with RA.  No thyroid.  Mother with UC     Tobacco (denies) , EtOH (occasional), recreational/illicit drugs (Eatibles.  2x/week - 5mg each time)     Occupation: Writer     Hx of clots - denies     ROS: Denies any mouth ulcers, Raynaud's, rash, photosensitivity, unintentional weight loss, vision changes, SOB, CP, myalgia, urinary/bowel symptoms, N/V, fever/chills, Sicca symptoms, recent travels/sick contacts, depression, insomnia, hair loss    Myalgia around areas of swelling due to the pain.     Review of Systems   Constitutional: Negative for chills, diaphoresis, fever, malaise/fatigue and weight loss.   HENT: Negative for congestion, ear discharge, ear pain, hearing loss, nosebleeds, sinus pain and tinnitus.    Eyes: Negative for  photophobia, pain, discharge and redness.   Respiratory: Negative for cough, hemoptysis, sputum production, shortness of breath, wheezing and stridor.    Cardiovascular: Negative for chest pain, palpitations, orthopnea, claudication, leg swelling and PND.   Gastrointestinal: Negative for abdominal pain, constipation, diarrhea, heartburn, nausea and vomiting.   Genitourinary: Negative for dysuria, frequency, hematuria and urgency.   Musculoskeletal: Positive for joint pain and myalgias. Negative for back pain and neck pain.   Skin: Negative for rash.   Neurological: Negative for dizziness, tingling, tremors, weakness and headaches.   Endo/Heme/Allergies: Does not bruise/bleed easily.   Psychiatric/Behavioral: Negative for depression, hallucinations and suicidal ideas. The patient is not nervous/anxious and does not have insomnia.        Past Medical History:   Diagnosis Date    Anxiety     Arthritis     Depression     Diabetes mellitus, type 2     pt takes meds. sugar is borderline    Gout     Hyperlipidemia     Pseudogout        Past Surgical History:   Procedure Laterality Date    achillies      rupture        Social History     Tobacco Use    Smoking status: Never Smoker    Smokeless tobacco: Never Used   Substance Use Topics    Alcohol use: Yes     Frequency: Monthly or less     Drinks per session: Patient refused     Binge frequency: Never    Drug use: Yes     Frequency: 1.0 times per week     Types: Marijuana     Comment: THC cookies       Family History   Problem Relation Age of Onset    Alcohol abuse Father     Cancer Father     Depression Father     Diabetes Father     Drug abuse Father     Heart disease Father     Hyperlipidemia Father     Hypertension Father     Mental illness Father     Arthritis Sister     Depression Sister     Drug abuse Sister     Hyperlipidemia Sister     Hypertension Sister     Mental illness Sister     Alcohol abuse Maternal Aunt     Drug abuse Maternal  "Aunt     Alcohol abuse Maternal Uncle     Drug abuse Maternal Uncle     Hyperlipidemia Maternal Uncle     Hypertension Maternal Uncle     Cancer Maternal Uncle         kidney cancer    Hyperlipidemia Paternal Uncle     Drug abuse Maternal Grandfather     Early death Maternal Grandfather 64    Heart disease Maternal Grandfather     Hypertension Maternal Grandfather     Arthritis Paternal Grandmother     Early death Paternal Grandmother 63    Stroke Paternal Grandmother     Pulmonary embolism Paternal Grandmother     Arthritis Paternal Grandfather     Cancer Paternal Grandfather         kidney cancer    Kidney disease Paternal Grandfather        Review of patient's allergies indicates:   Allergen Reactions    Vicryl [sutures, polyglycolic acid] Other (See Comments)     Body rejected vicryl sutures due to allergy--had wound dehiscence       Vitals:    12/31/20 1259   BP: (!) 150/96   Pulse: 88   Weight: 110 kg (242 lb 8.1 oz)   Height: 5' 11" (1.803 m)   PainSc:   4       Physical Exam   Constitutional: He is oriented to person, place, and time and well-developed, well-nourished, and in no distress.   HENT:   Head: Normocephalic and atraumatic.   No signs of tophi   Eyes: Pupils are equal, round, and reactive to light. EOM are normal.   Neck: Normal range of motion. Neck supple.   Cardiovascular: Normal rate, regular rhythm and normal heart sounds.   Pulmonary/Chest: Effort normal and breath sounds normal.   Abdominal: Soft. Bowel sounds are normal.   Musculoskeletal: Normal range of motion.      Comments: Bilateral knee crepitus   No signs of synovitis, enthesitis or dactylitis   Normal ROM and strength   Bilateral tinel's sign   Neurological: He is alert and oriented to person, place, and time. Gait normal. GCS score is 15.   Skin: Skin is warm and dry. No rash noted. No erythema.   No rash    Psychiatric: Mood, memory, affect and judgment normal.       Labs:-  Results for KUSHAL TRIPLETT (MRN " [id number]) as of 12/24/2020 08:35   Ref. Range 8/25/2020 08:20   WBC Latest Ref Range: 3.90 - 12.70 K/uL 5.93   RBC Latest Ref Range: 4.60 - 6.20 M/uL 5.44   Hemoglobin Latest Ref Range: 14.0 - 18.0 g/dL 16.0   Hematocrit Latest Ref Range: 40.0 - 54.0 % 46.8   MCV Latest Ref Range: 82 - 98 fL 86   MCH Latest Ref Range: 27.0 - 31.0 pg 29.4   MCHC Latest Ref Range: 32.0 - 36.0 g/dL 34.2   RDW Latest Ref Range: 11.5 - 14.5 % 12.3   Platelets Latest Ref Range: 150 - 350 K/uL 287   MPV Latest Ref Range: 9.2 - 12.9 fL 10.2   Gran % Latest Ref Range: 38.0 - 73.0 % 59.2   Lymph % Latest Ref Range: 18.0 - 48.0 % 30.0   Mono % Latest Ref Range: 4.0 - 15.0 % 7.3   Eosinophil % Latest Ref Range: 0.0 - 8.0 % 2.4   Basophil % Latest Ref Range: 0.0 - 1.9 % 0.8   Immature Granulocytes Latest Ref Range: 0.0 - 0.5 % 0.3   Gran # (ANC) Latest Ref Range: 1.8 - 7.7 K/uL 3.5   Lymph # Latest Ref Range: 1.0 - 4.8 K/uL 1.8   Mono # Latest Ref Range: 0.3 - 1.0 K/uL 0.4   Eos # Latest Ref Range: 0.0 - 0.5 K/uL 0.1   Baso # Latest Ref Range: 0.00 - 0.20 K/uL 0.05   Immature Grans (Abs) Latest Ref Range: 0.00 - 0.04 K/uL 0.02   nRBC Latest Ref Range: 0 /100 WBC 0   Differential Method Unknown Automated   Vitamin B-12 Latest Ref Range: 210 - 950 pg/mL 497   Sed Rate Latest Ref Range: 0 - 10 mm/Hr 5   Sodium Latest Ref Range: 136 - 145 mmol/L 136   Potassium Latest Ref Range: 3.5 - 5.1 mmol/L 4.4   Chloride Latest Ref Range: 95 - 110 mmol/L 101   CO2 Latest Ref Range: 23 - 29 mmol/L 24   Anion Gap Latest Ref Range: 8 - 16 mmol/L 11   BUN Latest Ref Range: 6 - 20 mg/dL 14   Creatinine Latest Ref Range: 0.5 - 1.4 mg/dL 1.0   eGFR if non African American Latest Ref Range: >60 mL/min/1.73 m^2 >60.0   eGFR if African American Latest Ref Range: >60 mL/min/1.73 m^2 >60.0   Glucose Latest Ref Range: 70 - 110 mg/dL 147 (H)   Calcium Latest Ref Range: 8.7 - 10.5 mg/dL 9.1   Alkaline Phosphatase Latest Ref Range: 55 - 135 U/L 74   PROTEIN TOTAL Latest Ref  Range: 6.0 - 8.4 g/dL 7.8   Albumin Latest Ref Range: 3.5 - 5.2 g/dL 4.6   Uric Acid Latest Ref Range: 3.4 - 7.0 mg/dL 8.4 (H)   BILIRUBIN TOTAL Latest Ref Range: 0.1 - 1.0 mg/dL 0.8   AST Latest Ref Range: 10 - 40 U/L 36   ALT Latest Ref Range: 10 - 44 U/L 77 (H)   Triglycerides Latest Ref Range: 30 - 150 mg/dL 185 (H)   Cholesterol Latest Ref Range: 120 - 199 mg/dL 163   HDL Latest Ref Range: 40 - 75 mg/dL 37 (L)   HDL/Cholesterol Ratio Latest Ref Range: 20.0 - 50.0 % 22.7   LDL Cholesterol External Latest Ref Range: 63.0 - 159.0 mg/dL 89.0   Non-HDL Cholesterol Latest Units: mg/dL 126   Total Cholesterol/HDL Ratio Latest Ref Range: 2.0 - 5.0  4.4   Vit D, 25-Hydroxy Latest Ref Range: 30 - 96 ng/mL 35   Hemoglobin A1C External Latest Ref Range: 4.5 - 6.2 % 6.9 (H)   Estimated Avg Glucose Latest Ref Range: 68 - 131 mg/dL 151 (H)   TSH Latest Ref Range: 0.340 - 5.600 uIU/mL 1.100   Free T4 Latest Ref Range: 0.71 - 1.51 ng/dL 0.68 (L)   FANNY Screen Latest Ref Range: Negative <1:80  Positive (A)   FANNY Titer 1 Unknown 1:160   FANNY PATTERN 1 Unknown Homogeneous   ds DNA Ab Latest Ref Range: Negative 1:10  Negative 1:10   Anti-SSA Antibody Latest Ref Range: 0.00 - 0.99 Ratio 0.10   Anti-SSA Interpretation Latest Ref Range: Negative  Negative   Anti-SSB Antibody Latest Ref Range: 0.00 - 0.99 Ratio 0.29   Anti-SSB Interpretation Latest Ref Range: Negative  Negative   Anti Sm Antibody Latest Ref Range: 0.00 - 0.99 Ratio 0.07   Anti-Sm Interpretation Latest Ref Range: Negative  Negative   Anti Sm/RNP Antibody Latest Ref Range: 0.00 - 0.99 Ratio 0.08   Anti-Sm/RNP Interpretation Latest Ref Range: Negative  Negative   Rheumatoid Factor Latest Ref Range: 0.0 - 15.0 IU/mL 12.0     Radiographs:-  Independent visualization of images done.    Old and Outside medical records :-  Reviewed old and all outside medical records available in Care Everywhere.    Medication List with Changes/Refills   Current Medications    ALLOPURINOL  (ZYLOPRIM) 300 MG TABLET    Take 1 tablet PO daily for gout prevention.    BUPROPION (WELLBUTRIN XL) 150 MG TB24 TABLET    Take 1 tablet (150 mg total) by mouth once daily.    CICLOPIROX (LOPROX) 0.77 % CREA    Apply topically 2 (two) times daily. For athlete's feet. for 14 days    COLCHICINE (MITIGARE) 0.6 MG CAP    Take 2 tablets immediately at onset of gout flare, then 1 tablet an hour later. The next day, you may start taking 1 tablet twice daily as needed for gout flare.    CYANOCOBALAMIN 1,000 MCG/ML INJECTION    Inject 1 mL (1,000 mcg total) into the muscle every 28 days.    ESCITALOPRAM OXALATE (LEXAPRO) 20 MG TABLET    Take 1 tablet (20 mg total) by mouth once daily.    INDOMETHACIN (INDOCIN) 50 MG CAPSULE    Take 1 capsule (50 mg total) by mouth every meal as needed (pain).    LOSARTAN (COZAAR) 50 MG TABLET    Take 1 ablet PO daily  TO PROTECT KIDNEYS    METFORMIN (GLUCOPHAGE-XR) 500 MG ER 24HR TABLET    Take 2 tablets PO daily with a meal  FOR DIABETES    PROPRANOLOL (INDERAL) 10 MG TABLET    TK 1 T PO BID    ROSUVASTATIN (CRESTOR) 20 MG TABLET    Take 1 tablet PO daily  FOR CHOLESTEROL    TRULICITY 1.5 MG/0.5 ML PEN INJECTOR    Inject 1.5 mg into the skin once a week.    VORTIOXETINE (TRINTELLIX) 10 MG TAB    Take 1 tablet (10 mg total) by mouth once daily. For anxiety.       Assessment/Plans:-   40 y.o. pleasant male with PMH of gout (crystal proven), HTN, HLPD, DMII, and depression comes in for an initial visit of arthritis.    Labs: +FANNY 1:160 H with negative CARLOS ENRIQUE.  Negative RF.  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      Arthralgia - most likely multifactorial - gout with OA.  Concern about RA.     Plan   - check CCP  - CBC, CMP, ESR, CRP, uric acid  - arthritis survey   - referral to PT - water aerobics  - recommendation for weight loss and dieting  - maintain gout diet - hand out provided  - education provided on the disease  process and management of gout and OA - ACR hand out provided  - recommendation for daily wrist splint in the PM - bilateral mild CTS  - continue daily allopurinol at previous dose at this time  - target uric acid is <6 for non tophus gout.  <5.5 for tophus gout  - increase daily hydration   - notify me if +gout attack     Follow up in about 5 weeks (around 2/4/2021).    Thank you for allowing me to participate in the care ofButch Meneses.    Disclaimer: This note was prepared using voice recognition system and is likely to have sound alike errors and is not proof read.  Please call me with any questions.

## 2020-12-29 ENCOUNTER — PATIENT OUTREACH (OUTPATIENT)
Dept: ADMINISTRATIVE | Facility: OTHER | Age: 40
End: 2020-12-29

## 2020-12-29 NOTE — PROGRESS NOTES
Chart was reviewed for overdue Proactive [facility] Encounters (JOSEP)  topics  Updates were requested from care everywhere  Health Maintenance was unable to be updated  [place] immunization registry triggered

## 2020-12-30 ENCOUNTER — PATIENT MESSAGE (OUTPATIENT)
Dept: FAMILY MEDICINE | Facility: CLINIC | Age: 40
End: 2020-12-30

## 2020-12-30 RX ORDER — COLCHICINE 0.6 MG/1
CAPSULE ORAL
Qty: 60 CAPSULE | Refills: 2 | Status: SHIPPED | OUTPATIENT
Start: 2020-12-30 | End: 2021-07-21 | Stop reason: SDUPTHER

## 2020-12-30 RX ORDER — INDOMETHACIN 50 MG/1
50 CAPSULE ORAL
Qty: 90 CAPSULE | Refills: 0 | Status: SHIPPED | OUTPATIENT
Start: 2020-12-30

## 2020-12-31 ENCOUNTER — OFFICE VISIT (OUTPATIENT)
Dept: RHEUMATOLOGY | Facility: CLINIC | Age: 40
End: 2020-12-31
Payer: COMMERCIAL

## 2020-12-31 ENCOUNTER — HOSPITAL ENCOUNTER (OUTPATIENT)
Dept: RADIOLOGY | Facility: HOSPITAL | Age: 40
Discharge: HOME | End: 2020-12-31
Attending: STUDENT IN AN ORGANIZED HEALTH CARE EDUCATION/TRAINING PROGRAM
Payer: COMMERCIAL

## 2020-12-31 VITALS
SYSTOLIC BLOOD PRESSURE: 150 MMHG | HEART RATE: 88 BPM | DIASTOLIC BLOOD PRESSURE: 96 MMHG | HEIGHT: 71 IN | WEIGHT: 242.5 LBS | BODY MASS INDEX: 33.95 KG/M2

## 2020-12-31 DIAGNOSIS — R76.8 ANA POSITIVE: ICD-10-CM

## 2020-12-31 DIAGNOSIS — E66.9 OBESITY (BMI 30.0-34.9): ICD-10-CM

## 2020-12-31 DIAGNOSIS — M11.20 PSEUDOGOUT: ICD-10-CM

## 2020-12-31 DIAGNOSIS — M25.50 ARTHRALGIA, UNSPECIFIED JOINT: ICD-10-CM

## 2020-12-31 DIAGNOSIS — M10.9 GOUT, UNSPECIFIED CAUSE, UNSPECIFIED CHRONICITY, UNSPECIFIED SITE: Primary | ICD-10-CM

## 2020-12-31 PROCEDURE — 1125F PR PAIN SEVERITY QUANTIFIED, PAIN PRESENT: ICD-10-PCS | Mod: S$GLB,,, | Performed by: STUDENT IN AN ORGANIZED HEALTH CARE EDUCATION/TRAINING PROGRAM

## 2020-12-31 PROCEDURE — 3008F BODY MASS INDEX DOCD: CPT | Mod: CPTII,S$GLB,, | Performed by: STUDENT IN AN ORGANIZED HEALTH CARE EDUCATION/TRAINING PROGRAM

## 2020-12-31 PROCEDURE — 99204 OFFICE O/P NEW MOD 45 MIN: CPT | Mod: S$GLB,,, | Performed by: STUDENT IN AN ORGANIZED HEALTH CARE EDUCATION/TRAINING PROGRAM

## 2020-12-31 PROCEDURE — 77077 JOINT SURVEY SINGLE VIEW: CPT | Mod: TC

## 2020-12-31 PROCEDURE — 99204 PR OFFICE/OUTPT VISIT, NEW, LEVL IV, 45-59 MIN: ICD-10-PCS | Mod: S$GLB,,, | Performed by: STUDENT IN AN ORGANIZED HEALTH CARE EDUCATION/TRAINING PROGRAM

## 2020-12-31 PROCEDURE — 3008F PR BODY MASS INDEX (BMI) DOCUMENTED: ICD-10-PCS | Mod: CPTII,S$GLB,, | Performed by: STUDENT IN AN ORGANIZED HEALTH CARE EDUCATION/TRAINING PROGRAM

## 2020-12-31 PROCEDURE — 99999 PR PBB SHADOW E&M-EST. PATIENT-LVL V: ICD-10-PCS | Mod: PBBFAC,,, | Performed by: STUDENT IN AN ORGANIZED HEALTH CARE EDUCATION/TRAINING PROGRAM

## 2020-12-31 PROCEDURE — 99999 PR PBB SHADOW E&M-EST. PATIENT-LVL V: CPT | Mod: PBBFAC,,, | Performed by: STUDENT IN AN ORGANIZED HEALTH CARE EDUCATION/TRAINING PROGRAM

## 2020-12-31 PROCEDURE — 77077 JOINT SURVEY SINGLE VIEW: CPT | Mod: 26,,, | Performed by: RADIOLOGY

## 2020-12-31 PROCEDURE — 1125F AMNT PAIN NOTED PAIN PRSNT: CPT | Mod: S$GLB,,, | Performed by: STUDENT IN AN ORGANIZED HEALTH CARE EDUCATION/TRAINING PROGRAM

## 2020-12-31 PROCEDURE — 77077 XR ARTHRITIS SURVEY: ICD-10-PCS | Mod: 26,,, | Performed by: RADIOLOGY

## 2020-12-31 NOTE — LETTER
December 31, 2020      Alexandra Byrd MD  [address] [facility] [place] [address]           [place] - Rheumatology  [address] [facility]  [place] [address]  Phone: [phone]  Fax: [phone]          Patient: Butch Meneses   MR Number: [id number]   YOB: 1980   Date of Visit: 12/31/2020       Dear Dr. Alexandra Byrd:    Thank you for referring Butch Meneses to me for evaluation. Attached you will find relevant portions of my assessment and plan of care.    If you have questions, please do not hesitate to call me. I look forward to following Butch Meneses along with you.    Sincerely,    Salud Bergman MD    Enclosure  CC:  No Recipients    If you would like to receive this communication electronically, please contact [email or web address] or [phone] to request more information on [facility] access.    For providers and/or their staff who would like to refer a patient to [facility], please contact us through our one-stop-shop provider referral line, [facility], at [phone].    If you feel you have received this communication in error or would no longer like to receive these types of communications, please e-mail [email or web address]

## 2021-01-04 ENCOUNTER — PATIENT MESSAGE (OUTPATIENT)
Dept: ADMINISTRATIVE | Facility: HOSPITAL | Age: 41
End: 2021-01-04

## 2021-01-06 ENCOUNTER — PATIENT MESSAGE (OUTPATIENT)
Dept: FAMILY MEDICINE | Facility: CLINIC | Age: 41
End: 2021-01-06

## 2021-01-19 ENCOUNTER — PATIENT MESSAGE (OUTPATIENT)
Dept: FAMILY MEDICINE | Facility: CLINIC | Age: 41
End: 2021-01-19

## 2021-01-20 ENCOUNTER — TELEPHONE (OUTPATIENT)
Dept: FAMILY MEDICINE | Facility: CLINIC | Age: 41
End: 2021-01-20

## 2021-01-20 ENCOUNTER — PATIENT MESSAGE (OUTPATIENT)
Dept: FAMILY MEDICINE | Facility: CLINIC | Age: 41
End: 2021-01-20

## 2021-01-21 ENCOUNTER — PATIENT MESSAGE (OUTPATIENT)
Dept: FAMILY MEDICINE | Facility: CLINIC | Age: 41
End: 2021-01-21

## 2021-01-21 RX ORDER — TRAZODONE HYDROCHLORIDE 50 MG/1
50 TABLET ORAL NIGHTLY
Qty: 30 TABLET | Refills: 2 | Status: SHIPPED | OUTPATIENT
Start: 2021-01-21 | End: 2021-02-24

## 2021-01-22 ENCOUNTER — PATIENT MESSAGE (OUTPATIENT)
Dept: FAMILY MEDICINE | Facility: CLINIC | Age: 41
End: 2021-01-22

## 2021-02-02 ENCOUNTER — PATIENT MESSAGE (OUTPATIENT)
Dept: FAMILY MEDICINE | Facility: CLINIC | Age: 41
End: 2021-02-02

## 2021-02-23 ENCOUNTER — PATIENT MESSAGE (OUTPATIENT)
Dept: RHEUMATOLOGY | Facility: CLINIC | Age: 41
End: 2021-02-23

## 2021-02-24 RX ORDER — VILAZODONE HYDROCHLORIDE 10 MG-20MG
KIT ORAL
Qty: 30 TABLET | Refills: 0 | Status: SHIPPED | OUTPATIENT
Start: 2021-02-24 | End: 2021-07-21

## 2021-02-25 ENCOUNTER — PATIENT MESSAGE (OUTPATIENT)
Dept: FAMILY MEDICINE | Facility: CLINIC | Age: 41
End: 2021-02-25

## 2021-03-04 DIAGNOSIS — Z11.59 NEED FOR HEPATITIS C SCREENING TEST: ICD-10-CM

## 2021-03-10 DIAGNOSIS — E11.9 TYPE 2 DIABETES MELLITUS WITHOUT COMPLICATION: ICD-10-CM

## 2021-03-15 ENCOUNTER — PATIENT OUTREACH (OUTPATIENT)
Dept: ADMINISTRATIVE | Facility: HOSPITAL | Age: 41
End: 2021-03-15

## 2021-03-16 ENCOUNTER — APPOINTMENT (OUTPATIENT)
Dept: LAB | Facility: HOSPITAL | Age: 41
End: 2021-03-16
Attending: FAMILY MEDICINE
Payer: COMMERCIAL

## 2021-03-16 ENCOUNTER — LAB VISIT (OUTPATIENT)
Dept: FAMILY MEDICINE | Facility: CLINIC | Age: 41
End: 2021-03-16
Payer: COMMERCIAL

## 2021-03-16 DIAGNOSIS — Z11.59 NEED FOR HEPATITIS C SCREENING TEST: ICD-10-CM

## 2021-03-16 DIAGNOSIS — E11.9 TYPE 2 DIABETES MELLITUS WITHOUT COMPLICATION: ICD-10-CM

## 2021-03-16 LAB
ESTIMATED AVG GLUCOSE: 197 MG/DL (ref 68–131)
HBA1C MFR BLD: 8.5 % (ref 4.5–6.2)

## 2021-03-16 PROCEDURE — 83036 HEMOGLOBIN GLYCOSYLATED A1C: CPT | Performed by: FAMILY MEDICINE

## 2021-03-16 PROCEDURE — 86803 HEPATITIS C AB TEST: CPT | Performed by: FAMILY MEDICINE

## 2021-03-17 LAB — HCV AB SERPL QL IA: NEGATIVE

## 2021-03-19 ENCOUNTER — PATIENT MESSAGE (OUTPATIENT)
Dept: FAMILY MEDICINE | Facility: CLINIC | Age: 41
End: 2021-03-19

## 2021-03-24 ENCOUNTER — TELEPHONE (OUTPATIENT)
Dept: RHEUMATOLOGY | Facility: CLINIC | Age: 41
End: 2021-03-24

## 2021-04-29 RX ORDER — LOSARTAN POTASSIUM 50 MG/1
TABLET ORAL
Qty: 90 TABLET | Refills: 3 | Status: SHIPPED | OUTPATIENT
Start: 2021-04-29 | End: 2021-07-21 | Stop reason: SDUPTHER

## 2021-04-29 RX ORDER — ALLOPURINOL 300 MG/1
TABLET ORAL
Qty: 90 TABLET | Refills: 3 | Status: SHIPPED | OUTPATIENT
Start: 2021-04-29 | End: 2021-07-21 | Stop reason: SDUPTHER

## 2021-04-29 RX ORDER — ROSUVASTATIN CALCIUM 20 MG/1
TABLET, COATED ORAL
Qty: 90 TABLET | Refills: 3 | Status: SHIPPED | OUTPATIENT
Start: 2021-04-29 | End: 2021-07-21 | Stop reason: SDUPTHER

## 2021-04-29 RX ORDER — BUPROPION HYDROCHLORIDE 150 MG/1
150 TABLET ORAL DAILY
Qty: 90 TABLET | Refills: 3 | Status: SHIPPED | OUTPATIENT
Start: 2021-04-29 | End: 2022-04-29

## 2021-05-13 DIAGNOSIS — E11.9 TYPE 2 DIABETES MELLITUS WITHOUT COMPLICATION: ICD-10-CM

## 2021-05-18 ENCOUNTER — PATIENT MESSAGE (OUTPATIENT)
Dept: ADMINISTRATIVE | Facility: HOSPITAL | Age: 41
End: 2021-05-18

## 2021-05-31 ENCOUNTER — PATIENT OUTREACH (OUTPATIENT)
Dept: ADMINISTRATIVE | Facility: OTHER | Age: 41
End: 2021-05-31

## 2021-05-31 ENCOUNTER — PATIENT MESSAGE (OUTPATIENT)
Dept: OPTOMETRY | Facility: CLINIC | Age: 41
End: 2021-05-31

## 2021-05-31 ENCOUNTER — TELEPHONE (OUTPATIENT)
Dept: OPTOMETRY | Facility: CLINIC | Age: 41
End: 2021-05-31

## 2021-07-20 ENCOUNTER — TELEPHONE (OUTPATIENT)
Dept: FAMILY MEDICINE | Facility: CLINIC | Age: 41
End: 2021-07-20

## 2021-07-20 DIAGNOSIS — E11.9 DIABETES MELLITUS WITHOUT COMPLICATION: Primary | ICD-10-CM

## 2021-07-21 ENCOUNTER — OFFICE VISIT (OUTPATIENT)
Dept: FAMILY MEDICINE | Facility: CLINIC | Age: 41
End: 2021-07-21
Payer: COMMERCIAL

## 2021-07-21 ENCOUNTER — PATIENT MESSAGE (OUTPATIENT)
Dept: FAMILY MEDICINE | Facility: CLINIC | Age: 41
End: 2021-07-21

## 2021-07-21 VITALS
TEMPERATURE: 99 F | SYSTOLIC BLOOD PRESSURE: 139 MMHG | BODY MASS INDEX: 31.96 KG/M2 | HEART RATE: 97 BPM | OXYGEN SATURATION: 95 % | WEIGHT: 228.31 LBS | HEIGHT: 71 IN | DIASTOLIC BLOOD PRESSURE: 93 MMHG

## 2021-07-21 DIAGNOSIS — M10.9 GOUTY ARTHRITIS: ICD-10-CM

## 2021-07-21 DIAGNOSIS — E11.9 TYPE 2 DIABETES MELLITUS WITHOUT COMPLICATION, WITHOUT LONG-TERM CURRENT USE OF INSULIN: Primary | ICD-10-CM

## 2021-07-21 DIAGNOSIS — F41.9 ANXIETY: ICD-10-CM

## 2021-07-21 DIAGNOSIS — F33.1 MODERATE EPISODE OF RECURRENT MAJOR DEPRESSIVE DISORDER: ICD-10-CM

## 2021-07-21 PROCEDURE — 3052F PR MOST RECENT HEMOGLOBIN A1C LEVEL 8.0 - < 9.0%: ICD-10-PCS | Mod: S$GLB,,, | Performed by: FAMILY MEDICINE

## 2021-07-21 PROCEDURE — 3008F PR BODY MASS INDEX (BMI) DOCUMENTED: ICD-10-PCS | Mod: S$GLB,,, | Performed by: FAMILY MEDICINE

## 2021-07-21 PROCEDURE — 99999 PR PBB SHADOW E&M-EST. PATIENT-LVL III: CPT | Mod: PBBFAC,,, | Performed by: FAMILY MEDICINE

## 2021-07-21 PROCEDURE — 3052F HG A1C>EQUAL 8.0%<EQUAL 9.0%: CPT | Mod: S$GLB,,, | Performed by: FAMILY MEDICINE

## 2021-07-21 PROCEDURE — 1126F AMNT PAIN NOTED NONE PRSNT: CPT | Mod: S$GLB,,, | Performed by: FAMILY MEDICINE

## 2021-07-21 PROCEDURE — 99215 PR OFFICE/OUTPT VISIT, EST, LEVL V, 40-54 MIN: ICD-10-PCS | Mod: S$GLB,,, | Performed by: FAMILY MEDICINE

## 2021-07-21 PROCEDURE — 3008F BODY MASS INDEX DOCD: CPT | Mod: S$GLB,,, | Performed by: FAMILY MEDICINE

## 2021-07-21 PROCEDURE — 1126F PR PAIN SEVERITY QUANTIFIED, NO PAIN PRESENT: ICD-10-PCS | Mod: S$GLB,,, | Performed by: FAMILY MEDICINE

## 2021-07-21 PROCEDURE — 99215 OFFICE O/P EST HI 40 MIN: CPT | Mod: S$GLB,,, | Performed by: FAMILY MEDICINE

## 2021-07-21 PROCEDURE — 99999 PR PBB SHADOW E&M-EST. PATIENT-LVL III: ICD-10-PCS | Mod: PBBFAC,,, | Performed by: FAMILY MEDICINE

## 2021-07-21 RX ORDER — COLCHICINE 0.6 MG/1
CAPSULE ORAL
Qty: 60 CAPSULE | Refills: 5 | Status: SHIPPED | OUTPATIENT
Start: 2021-07-21

## 2021-07-21 RX ORDER — CITALOPRAM 20 MG/1
20 TABLET, FILM COATED ORAL DAILY
Qty: 30 TABLET | Refills: 11 | Status: SHIPPED | OUTPATIENT
Start: 2021-07-21 | End: 2021-08-24 | Stop reason: SDUPTHER

## 2021-07-21 RX ORDER — LOSARTAN POTASSIUM 50 MG/1
TABLET ORAL
Qty: 30 TABLET | Refills: 11 | Status: SHIPPED | OUTPATIENT
Start: 2021-07-21

## 2021-07-21 RX ORDER — LORAZEPAM 1 MG/1
1 TABLET ORAL EVERY 8 HOURS PRN
Qty: 12 TABLET | Refills: 0 | Status: SHIPPED | OUTPATIENT
Start: 2021-07-21 | End: 2021-08-24 | Stop reason: SDUPTHER

## 2021-07-21 RX ORDER — ALLOPURINOL 300 MG/1
TABLET ORAL
Qty: 30 TABLET | Refills: 11 | Status: SHIPPED | OUTPATIENT
Start: 2021-07-21

## 2021-07-21 RX ORDER — METFORMIN HYDROCHLORIDE 500 MG/1
TABLET, EXTENDED RELEASE ORAL
Qty: 120 TABLET | Refills: 11 | Status: SHIPPED | OUTPATIENT
Start: 2021-07-21

## 2021-07-21 RX ORDER — ROSUVASTATIN CALCIUM 20 MG/1
TABLET, COATED ORAL
Qty: 30 TABLET | Refills: 11 | Status: SHIPPED | OUTPATIENT
Start: 2021-07-21

## 2021-07-22 ENCOUNTER — OFFICE VISIT (OUTPATIENT)
Dept: FAMILY MEDICINE | Facility: CLINIC | Age: 41
End: 2021-07-22
Payer: COMMERCIAL

## 2021-07-22 VITALS
DIASTOLIC BLOOD PRESSURE: 90 MMHG | HEART RATE: 95 BPM | WEIGHT: 227.88 LBS | BODY MASS INDEX: 31.9 KG/M2 | HEIGHT: 71 IN | TEMPERATURE: 98 F | OXYGEN SATURATION: 96 % | SYSTOLIC BLOOD PRESSURE: 140 MMHG

## 2021-07-22 DIAGNOSIS — L02.31 ABSCESS, GLUTEAL, LEFT: Primary | ICD-10-CM

## 2021-07-22 DIAGNOSIS — E11.9 TYPE 2 DIABETES MELLITUS WITHOUT COMPLICATION, WITHOUT LONG-TERM CURRENT USE OF INSULIN: ICD-10-CM

## 2021-07-22 PROCEDURE — 3052F HG A1C>EQUAL 8.0%<EQUAL 9.0%: CPT | Mod: S$GLB,,, | Performed by: FAMILY MEDICINE

## 2021-07-22 PROCEDURE — 99214 PR OFFICE/OUTPT VISIT, EST, LEVL IV, 30-39 MIN: ICD-10-PCS | Mod: S$GLB,,, | Performed by: FAMILY MEDICINE

## 2021-07-22 PROCEDURE — 1125F PR PAIN SEVERITY QUANTIFIED, PAIN PRESENT: ICD-10-PCS | Mod: S$GLB,,, | Performed by: FAMILY MEDICINE

## 2021-07-22 PROCEDURE — 99999 PR PBB SHADOW E&M-EST. PATIENT-LVL V: CPT | Mod: PBBFAC,,, | Performed by: FAMILY MEDICINE

## 2021-07-22 PROCEDURE — 99999 PR PBB SHADOW E&M-EST. PATIENT-LVL V: ICD-10-PCS | Mod: PBBFAC,,, | Performed by: FAMILY MEDICINE

## 2021-07-22 PROCEDURE — 3052F PR MOST RECENT HEMOGLOBIN A1C LEVEL 8.0 - < 9.0%: ICD-10-PCS | Mod: S$GLB,,, | Performed by: FAMILY MEDICINE

## 2021-07-22 PROCEDURE — 99214 OFFICE O/P EST MOD 30 MIN: CPT | Mod: S$GLB,,, | Performed by: FAMILY MEDICINE

## 2021-07-22 PROCEDURE — 3008F PR BODY MASS INDEX (BMI) DOCUMENTED: ICD-10-PCS | Mod: S$GLB,,, | Performed by: FAMILY MEDICINE

## 2021-07-22 PROCEDURE — 1125F AMNT PAIN NOTED PAIN PRSNT: CPT | Mod: S$GLB,,, | Performed by: FAMILY MEDICINE

## 2021-07-22 PROCEDURE — 3008F BODY MASS INDEX DOCD: CPT | Mod: S$GLB,,, | Performed by: FAMILY MEDICINE

## 2021-07-22 RX ORDER — ONDANSETRON 4 MG/1
4 TABLET, ORALLY DISINTEGRATING ORAL EVERY 6 HOURS PRN
Qty: 30 TABLET | Refills: 1 | Status: SHIPPED | OUTPATIENT
Start: 2021-07-22

## 2021-07-22 RX ORDER — SULFAMETHOXAZOLE AND TRIMETHOPRIM 800; 160 MG/1; MG/1
TABLET ORAL
Qty: 30 TABLET | Refills: 0 | Status: SHIPPED | OUTPATIENT
Start: 2021-07-22 | End: 2021-08-24 | Stop reason: ALTCHOICE

## 2021-07-23 ENCOUNTER — OFFICE VISIT (OUTPATIENT)
Dept: FAMILY MEDICINE | Facility: CLINIC | Age: 41
End: 2021-07-23
Payer: COMMERCIAL

## 2021-07-23 ENCOUNTER — PATIENT MESSAGE (OUTPATIENT)
Dept: FAMILY MEDICINE | Facility: CLINIC | Age: 41
End: 2021-07-23

## 2021-07-23 VITALS
SYSTOLIC BLOOD PRESSURE: 131 MMHG | TEMPERATURE: 98 F | DIASTOLIC BLOOD PRESSURE: 85 MMHG | HEART RATE: 89 BPM | OXYGEN SATURATION: 96 %

## 2021-07-23 DIAGNOSIS — L02.31 ABSCESS, GLUTEAL, LEFT: Primary | ICD-10-CM

## 2021-07-23 PROCEDURE — 99214 OFFICE O/P EST MOD 30 MIN: CPT | Mod: S$GLB,,, | Performed by: FAMILY MEDICINE

## 2021-07-23 PROCEDURE — 99999 PR PBB SHADOW E&M-EST. PATIENT-LVL III: ICD-10-PCS | Mod: PBBFAC,,, | Performed by: FAMILY MEDICINE

## 2021-07-23 PROCEDURE — 3052F PR MOST RECENT HEMOGLOBIN A1C LEVEL 8.0 - < 9.0%: ICD-10-PCS | Mod: S$GLB,,, | Performed by: FAMILY MEDICINE

## 2021-07-23 PROCEDURE — 3052F HG A1C>EQUAL 8.0%<EQUAL 9.0%: CPT | Mod: S$GLB,,, | Performed by: FAMILY MEDICINE

## 2021-07-23 PROCEDURE — 1126F PR PAIN SEVERITY QUANTIFIED, NO PAIN PRESENT: ICD-10-PCS | Mod: S$GLB,,, | Performed by: FAMILY MEDICINE

## 2021-07-23 PROCEDURE — 1126F AMNT PAIN NOTED NONE PRSNT: CPT | Mod: S$GLB,,, | Performed by: FAMILY MEDICINE

## 2021-07-23 PROCEDURE — 99214 PR OFFICE/OUTPT VISIT, EST, LEVL IV, 30-39 MIN: ICD-10-PCS | Mod: S$GLB,,, | Performed by: FAMILY MEDICINE

## 2021-07-23 PROCEDURE — 99999 PR PBB SHADOW E&M-EST. PATIENT-LVL III: CPT | Mod: PBBFAC,,, | Performed by: FAMILY MEDICINE

## 2021-07-23 RX ORDER — MUPIROCIN 20 MG/G
OINTMENT TOPICAL 3 TIMES DAILY
Qty: 30 G | Refills: 2 | Status: SHIPPED | OUTPATIENT
Start: 2021-07-23

## 2021-07-23 RX ORDER — HYDROCODONE BITARTRATE AND ACETAMINOPHEN 5; 325 MG/1; MG/1
1 TABLET ORAL EVERY 8 HOURS PRN
Qty: 20 TABLET | Refills: 0 | Status: SHIPPED | OUTPATIENT
Start: 2021-07-23

## 2021-07-26 ENCOUNTER — PATIENT MESSAGE (OUTPATIENT)
Dept: ADMINISTRATIVE | Facility: HOSPITAL | Age: 41
End: 2021-07-26

## 2021-07-26 ENCOUNTER — PATIENT OUTREACH (OUTPATIENT)
Dept: ADMINISTRATIVE | Facility: HOSPITAL | Age: 41
End: 2021-07-26

## 2021-07-27 ENCOUNTER — TELEPHONE (OUTPATIENT)
Dept: FAMILY MEDICINE | Facility: CLINIC | Age: 41
End: 2021-07-27

## 2021-08-04 ENCOUNTER — PATIENT MESSAGE (OUTPATIENT)
Dept: ADMINISTRATIVE | Facility: HOSPITAL | Age: 41
End: 2021-08-04

## 2021-08-04 ENCOUNTER — PATIENT MESSAGE (OUTPATIENT)
Dept: FAMILY MEDICINE | Facility: CLINIC | Age: 41
End: 2021-08-04

## 2021-08-23 ENCOUNTER — PATIENT MESSAGE (OUTPATIENT)
Dept: ADMINISTRATIVE | Facility: HOSPITAL | Age: 41
End: 2021-08-23

## 2021-08-24 ENCOUNTER — PATIENT MESSAGE (OUTPATIENT)
Dept: FAMILY MEDICINE | Facility: CLINIC | Age: 41
End: 2021-08-24

## 2021-08-24 ENCOUNTER — OFFICE VISIT (OUTPATIENT)
Dept: FAMILY MEDICINE | Facility: CLINIC | Age: 41
End: 2021-08-24
Payer: COMMERCIAL

## 2021-08-24 VITALS
HEIGHT: 71 IN | BODY MASS INDEX: 32.1 KG/M2 | OXYGEN SATURATION: 98 % | WEIGHT: 229.25 LBS | HEART RATE: 93 BPM | SYSTOLIC BLOOD PRESSURE: 139 MMHG | TEMPERATURE: 98 F | DIASTOLIC BLOOD PRESSURE: 94 MMHG

## 2021-08-24 DIAGNOSIS — B35.6 TINEA CRURIS: ICD-10-CM

## 2021-08-24 DIAGNOSIS — F33.1 MODERATE EPISODE OF RECURRENT MAJOR DEPRESSIVE DISORDER: ICD-10-CM

## 2021-08-24 DIAGNOSIS — F41.9 ANXIETY: ICD-10-CM

## 2021-08-24 DIAGNOSIS — E11.9 TYPE 2 DIABETES MELLITUS WITHOUT COMPLICATION, WITHOUT LONG-TERM CURRENT USE OF INSULIN: Primary | ICD-10-CM

## 2021-08-24 PROCEDURE — 3008F PR BODY MASS INDEX (BMI) DOCUMENTED: ICD-10-PCS | Mod: S$GLB,,, | Performed by: FAMILY MEDICINE

## 2021-08-24 PROCEDURE — 3080F PR MOST RECENT DIASTOLIC BLOOD PRESSURE >= 90 MM HG: ICD-10-PCS | Mod: S$GLB,,, | Performed by: FAMILY MEDICINE

## 2021-08-24 PROCEDURE — 3052F HG A1C>EQUAL 8.0%<EQUAL 9.0%: CPT | Mod: S$GLB,,, | Performed by: FAMILY MEDICINE

## 2021-08-24 PROCEDURE — 3052F PR MOST RECENT HEMOGLOBIN A1C LEVEL 8.0 - < 9.0%: ICD-10-PCS | Mod: S$GLB,,, | Performed by: FAMILY MEDICINE

## 2021-08-24 PROCEDURE — 99999 PR PBB SHADOW E&M-EST. PATIENT-LVL III: ICD-10-PCS | Mod: PBBFAC,,, | Performed by: FAMILY MEDICINE

## 2021-08-24 PROCEDURE — 99215 PR OFFICE/OUTPT VISIT, EST, LEVL V, 40-54 MIN: ICD-10-PCS | Mod: S$GLB,,, | Performed by: FAMILY MEDICINE

## 2021-08-24 PROCEDURE — 3075F SYST BP GE 130 - 139MM HG: CPT | Mod: S$GLB,,, | Performed by: FAMILY MEDICINE

## 2021-08-24 PROCEDURE — 1159F MED LIST DOCD IN RCRD: CPT | Mod: S$GLB,,, | Performed by: FAMILY MEDICINE

## 2021-08-24 PROCEDURE — 99999 PR PBB SHADOW E&M-EST. PATIENT-LVL III: CPT | Mod: PBBFAC,,, | Performed by: FAMILY MEDICINE

## 2021-08-24 PROCEDURE — 1160F PR REVIEW ALL MEDS BY PRESCRIBER/CLIN PHARMACIST DOCUMENTED: ICD-10-PCS | Mod: S$GLB,,, | Performed by: FAMILY MEDICINE

## 2021-08-24 PROCEDURE — 99215 OFFICE O/P EST HI 40 MIN: CPT | Mod: S$GLB,,, | Performed by: FAMILY MEDICINE

## 2021-08-24 PROCEDURE — 1159F PR MEDICATION LIST DOCUMENTED IN MEDICAL RECORD: ICD-10-PCS | Mod: S$GLB,,, | Performed by: FAMILY MEDICINE

## 2021-08-24 PROCEDURE — 4010F PR ACE/ARB THEARPY RXD/TAKEN: ICD-10-PCS | Mod: S$GLB,,, | Performed by: FAMILY MEDICINE

## 2021-08-24 PROCEDURE — 3008F BODY MASS INDEX DOCD: CPT | Mod: S$GLB,,, | Performed by: FAMILY MEDICINE

## 2021-08-24 PROCEDURE — 1160F RVW MEDS BY RX/DR IN RCRD: CPT | Mod: S$GLB,,, | Performed by: FAMILY MEDICINE

## 2021-08-24 PROCEDURE — 3080F DIAST BP >= 90 MM HG: CPT | Mod: S$GLB,,, | Performed by: FAMILY MEDICINE

## 2021-08-24 PROCEDURE — 3075F PR MOST RECENT SYSTOLIC BLOOD PRESS GE 130-139MM HG: ICD-10-PCS | Mod: S$GLB,,, | Performed by: FAMILY MEDICINE

## 2021-08-24 PROCEDURE — 4010F ACE/ARB THERAPY RXD/TAKEN: CPT | Mod: S$GLB,,, | Performed by: FAMILY MEDICINE

## 2021-08-24 RX ORDER — LORAZEPAM 1 MG/1
1 TABLET ORAL EVERY 8 HOURS PRN
Qty: 20 TABLET | Refills: 0 | Status: SHIPPED | OUTPATIENT
Start: 2021-08-24 | End: 2021-09-23

## 2021-08-24 RX ORDER — CITALOPRAM 20 MG/1
20 TABLET, FILM COATED ORAL DAILY
Qty: 90 TABLET | Refills: 3 | Status: SHIPPED | OUTPATIENT
Start: 2021-08-24 | End: 2022-08-24

## 2021-08-24 RX ORDER — FLUCONAZOLE 200 MG/1
200 TABLET ORAL DAILY
Qty: 10 TABLET | Refills: 1 | Status: SHIPPED | OUTPATIENT
Start: 2021-08-24 | End: 2021-09-03

## 2021-08-27 ENCOUNTER — PATIENT MESSAGE (OUTPATIENT)
Dept: FAMILY MEDICINE | Facility: CLINIC | Age: 41
End: 2021-08-27

## 2021-10-06 DIAGNOSIS — E11.9 TYPE 2 DIABETES MELLITUS WITHOUT COMPLICATION, UNSPECIFIED WHETHER LONG TERM INSULIN USE: ICD-10-CM

## 2021-12-31 ENCOUNTER — PATIENT MESSAGE (OUTPATIENT)
Dept: FAMILY MEDICINE | Facility: CLINIC | Age: 41
End: 2021-12-31
Payer: COMMERCIAL

## 2022-01-10 ENCOUNTER — PATIENT MESSAGE (OUTPATIENT)
Dept: ADMINISTRATIVE | Facility: HOSPITAL | Age: 42
End: 2022-01-10
Payer: COMMERCIAL

## 2022-03-04 ENCOUNTER — TELEPHONE (OUTPATIENT)
Dept: FAMILY MEDICINE | Facility: CLINIC | Age: 42
End: 2022-03-04
Payer: COMMERCIAL

## 2022-07-06 NOTE — PROGRESS NOTES
"  Subjective:       Patient ID: Butch Meneses is a 40 y.o. male.    Chief Complaint: Annual Exam and Medication Refill    New patient. Here for annual wellness visit.    Hx of depression, gout, DM, HLD.    Gout and pseudogout--takes allopurinol daily, does not remember when last severe gout attack was. Dx at age 34 yo. Takes colchicine and indomethacin as needed. Last set of labs about 6 months ago.    Anxiety and depression--takes Lexapro 10mg BID. Still having some anxiety--feels chest tightness, SOB, "feels tight like food is caught." Admits that his breakthrough anxiety is always when he skips a dose. Before Lexapro he had panic attacks.     Had abnormal EKG ("weird EKG") which led to stress test which led to cath which was clear.    DM--"Borderline" and has responded to metformin and Trulicity 1.5mg.    Skin cancer--hx of BCC. Spent a good bit of time in the [place].          Review of Systems   Constitutional: Positive for fatigue and unexpected weight change (gain). Negative for appetite change and fever.   HENT: Negative for congestion and sinus pressure.    Eyes: Negative for photophobia, pain and visual disturbance.   Respiratory: Positive for choking and chest tightness. Negative for cough, shortness of breath and wheezing.    Cardiovascular: Negative for chest pain and palpitations.   Gastrointestinal: Negative for abdominal distention, abdominal pain, blood in stool, constipation, diarrhea, nausea and vomiting.   Endocrine: Negative for polydipsia and polyphagia.   Genitourinary: Negative for dysuria, frequency and urgency.   Musculoskeletal: Positive for arthralgias, joint swelling and neck stiffness. Negative for myalgias.   Skin: Negative for rash.   Allergic/Immunologic: Negative for environmental allergies.   Neurological: Negative for dizziness, weakness and headaches.   Hematological: Negative for adenopathy.   Psychiatric/Behavioral: Negative for sleep disturbance. The patient is " nervous/anxious.    All other systems reviewed and are negative.        Past Medical History:   Diagnosis Date    Anxiety     Arthritis     Depression     Diabetes mellitus, type 2     pt takes meds. sugar is borderline    Gout     Hyperlipidemia     Pseudogout      Past Surgical History:   Procedure Laterality Date    achillies      rupture     Family History   Problem Relation Age of Onset    Alcohol abuse Father     Cancer Father     Depression Father     Diabetes Father     Drug abuse Father     Heart disease Father     Hyperlipidemia Father     Hypertension Father     Mental illness Father     Arthritis Sister     Depression Sister     Drug abuse Sister     Hyperlipidemia Sister     Hypertension Sister     Mental illness Sister     Alcohol abuse Maternal Aunt     Drug abuse Maternal Aunt     Alcohol abuse Maternal Uncle     Drug abuse Maternal Uncle     Hyperlipidemia Maternal Uncle     Hypertension Maternal Uncle     Cancer Maternal Uncle         kidney cancer    Hyperlipidemia Paternal Uncle     Drug abuse Maternal Grandfather     Early death Maternal Grandfather 64    Heart disease Maternal Grandfather     Hypertension Maternal Grandfather     Arthritis Paternal Grandmother     Early death Paternal Grandmother 63    Stroke Paternal Grandmother     Pulmonary embolism Paternal Grandmother     Arthritis Paternal Grandfather     Cancer Paternal Grandfather         kidney cancer    Kidney disease Paternal Grandfather        Review of patient's allergies indicates:   Allergen Reactions    Vicryl [sutures, polyglycolic acid] Other (See Comments)     Body rejected vicryl sutures due to allergy--had wound dehiscence       Current Outpatient Medications:     colchicine (MITIGARE) 0.6 mg Cap, Take by mouth., Disp: , Rfl:     escitalopram oxalate (LEXAPRO) 10 MG tablet, TK 1 T PO BID, Disp: , Rfl:     indomethacin (INDOCIN) 50 MG capsule, Take by mouth., Disp: , Rfl:      "propranoloL (INDERAL) 10 MG tablet, TK 1 T PO BID, Disp: , Rfl:     allopurinoL (ZYLOPRIM) 300 MG tablet, Take 1 tablet PO daily for gout prevention., Disp: 90 tablet, Rfl: 3    buPROPion (WELLBUTRIN XL) 150 MG TB24 tablet, Take 1 tablet (150 mg total) by mouth once daily., Disp: 30 tablet, Rfl: 11    cholecalciferol, vitamin D3, (DECARA) 1,250 mcg (50,000 unit) capsule, Take 1 capsule (50,000 Units total) by mouth once a week. Take with a meal or with a spoonful of peanut butter for low vitamin D. for 12 doses, Disp: 12 capsule, Rfl: 0    ciclopirox (LOPROX) 0.77 % Crea, Apply topically 2 (two) times daily. For athlete's feet. for 14 days, Disp: 30 g, Rfl: 2    cyanocobalamin 1,000 mcg/mL injection, Inject 1 mL (1,000 mcg total) into the muscle every 28 days., Disp: 3 mL, Rfl: 3    losartan (COZAAR) 50 MG tablet, Take 1 ablet PO daily  TO PROTECT KIDNEYS, Disp: 90 tablet, Rfl: 3    metFORMIN (GLUCOPHAGE-XR) 500 MG ER 24hr tablet, Take 2 tablets PO daily with a meal  FOR DIABETES, Disp: 180 tablet, Rfl: 3    rosuvastatin (CRESTOR) 20 MG tablet, Take 1 tablet PO daily  FOR CHOLESTEROL, Disp: 90 tablet, Rfl: 3    TRULICITY 1.5 mg/0.5 mL pen injector, Inject 1.5 mg into the skin once a week., Disp: 12 pen, Rfl: 3      Objective:      BP (!) 126/91   Pulse 82   Temp 97.6 °F (36.4 °C)   Ht 5' 11" (1.803 m)   Wt 105 kg (231 lb 7.7 oz)   SpO2 98%   BMI 32.29 kg/m²   Physical Exam  Vitals signs and nursing note reviewed.   Constitutional:       General: He is not in acute distress.     Appearance: Normal appearance. He is well-developed. He is obese. He is not ill-appearing, toxic-appearing or diaphoretic.   HENT:      Head: Normocephalic and atraumatic.      Right Ear: Tympanic membrane, ear canal and external ear normal. There is no impacted cerumen.      Left Ear: Tympanic membrane, ear canal and external ear normal. There is no impacted cerumen.      Nose: Nose normal. No congestion or rhinorrhea.      " Mouth/Throat:      Mouth: Mucous membranes are moist.      Pharynx: Oropharynx is clear. No oropharyngeal exudate or posterior oropharyngeal erythema.   Eyes:      General: No scleral icterus.        Right eye: No discharge.         Left eye: No discharge.      Extraocular Movements: Extraocular movements intact.      Conjunctiva/sclera: Conjunctivae normal.      Pupils: Pupils are equal, round, and reactive to light.   Neck:      Musculoskeletal: Normal range of motion and neck supple. No neck rigidity or muscular tenderness.      Thyroid: No thyromegaly.      Vascular: No carotid bruit or JVD.      Trachea: No tracheal deviation.   Cardiovascular:      Rate and Rhythm: Normal rate and regular rhythm.      Pulses: Normal pulses.           Dorsalis pedis pulses are 2+ on the right side and 2+ on the left side.        Posterior tibial pulses are 2+ on the right side and 2+ on the left side.      Heart sounds: Normal heart sounds. No murmur. No gallop.    Pulmonary:      Effort: Pulmonary effort is normal. No respiratory distress.      Breath sounds: Normal breath sounds. No wheezing, rhonchi or rales.   Chest:      Chest wall: No tenderness.   Abdominal:      General: Abdomen is flat. Bowel sounds are normal. There is no distension.      Palpations: Abdomen is soft. There is no mass.      Tenderness: There is no abdominal tenderness. There is no right CVA tenderness, left CVA tenderness, guarding or rebound.      Hernia: No hernia is present.   Musculoskeletal: Normal range of motion.         General: Tenderness present. No swelling or deformity.      Right lower leg: No edema.      Left lower leg: No edema.      Right foot: Normal range of motion. No deformity, bunion, Charcot foot, foot drop or prominent metatarsal heads.      Left foot: Normal range of motion. No deformity, bunion, Charcot foot, foot drop or prominent metatarsal heads.        Feet:    Feet:      Right foot:      Protective Sensation: 10 sites  tested. 10 sites sensed.      Skin integrity: Skin breakdown (see diagram, otherwise normal skin integrity) present.      Toenail Condition: Right toenails are normal.      Left foot:      Protective Sensation: 10 sites tested. 10 sites sensed.      Skin integrity: Skin breakdown (see diagram, otherwise normal skin integrity) present.      Toenail Condition: Left toenails are normal.   Lymphadenopathy:      Cervical: No cervical adenopathy.   Skin:     General: Skin is warm and dry.      Capillary Refill: Capillary refill takes less than 2 seconds.      Coloration: Skin is not jaundiced or pale.      Findings: No bruising, erythema or rash.   Neurological:      General: No focal deficit present.      Mental Status: He is alert and oriented to person, place, and time. Mental status is at baseline.      Motor: No weakness.      Coordination: Coordination normal.      Gait: Gait normal.   Psychiatric:         Mood and Affect: Mood normal.         Behavior: Behavior normal.         Thought Content: Thought content normal.         Judgment: Judgment normal.         Assessment:       1. Wellness examination    2. Borderline diabetes    3. Fatigue, unspecified type    4. Encounter for long-term current use of medication    5. Arthralgia, unspecified joint    6. Tinea pedis of both feet    7. Annual visit for general adult medical examination with abnormal findings    8. Encounter for medical examination to establish care        Plan:       Wellness examination  -     Hemoglobin A1C; Future; Expected date: 08/25/2020  -     Lipid Panel; Future; Expected date: 08/25/2020  -     TSH; Future; Expected date: 08/25/2020  -     Fecal Immunochemical Test (iFOBT); Future; Expected date: 08/25/2020    Borderline diabetes  -     Hemoglobin A1C; Future; Expected date: 08/25/2020  -     CBC auto differential; Future; Expected date: 08/25/2020  -     Comprehensive metabolic panel; Future; Expected date: 08/25/2020  -     Lipid Panel;  Future; Expected date: 08/25/2020  -     Urinalysis, Reflex to Urine Culture Urine, Clean Catch; Future; Expected date: 08/25/2020  -     Microalbumin/creatinine urine ratio; Future; Expected date: 08/25/2020  -     TSH; Future; Expected date: 08/25/2020  -     T4, free; Future; Expected date: 08/25/2020  -     Vitamin D; Future; Expected date: 08/25/2020  -     Vitamin B12; Future; Expected date: 08/25/2020  -     Fecal Immunochemical Test (iFOBT); Future; Expected date: 08/25/2020    Fatigue, unspecified type  -     CBC auto differential; Future; Expected date: 08/25/2020  -     Comprehensive metabolic panel; Future; Expected date: 08/25/2020  -     TSH; Future; Expected date: 08/25/2020  -     T4, free; Future; Expected date: 08/25/2020  -     Vitamin D; Future; Expected date: 08/25/2020  -     Vitamin B12; Future; Expected date: 08/25/2020    Encounter for long-term current use of medication  -     Hemoglobin A1C; Future; Expected date: 08/25/2020  -     CBC auto differential; Future; Expected date: 08/25/2020  -     Comprehensive metabolic panel; Future; Expected date: 08/25/2020  -     Lipid Panel; Future; Expected date: 08/25/2020  -     Urinalysis, Reflex to Urine Culture Urine, Clean Catch; Future; Expected date: 08/25/2020  -     Vitamin D; Future; Expected date: 08/25/2020  -     Vitamin B12; Future; Expected date: 08/25/2020    Arthralgia, unspecified joint  -     Uric acid; Future; Expected date: 08/25/2020  -     Rheumatoid factor; Future; Expected date: 08/25/2020  -     FANNY Screen w/Reflex; Future; Expected date: 08/25/2020  -     Sedimentation rate; Future; Expected date: 08/25/2020    Tinea pedis of both feet  -     ciclopirox (LOPROX) 0.77 % Crea; Apply topically 2 (two) times daily. For athlete's feet. for 14 days  Dispense: 30 g; Refill: 2    Annual visit for general adult medical examination with abnormal findings    Encounter for medical examination to establish care    Obtain old  records.    Modify diet--grain and dairy are inflammatory; increase healthy fats and lean protein as well as green leafy vegetables. Wife has been encouraging him to follow the Whole 30 plan and he agrees to consider it.          Strict return precautions reviewed and patient verbalized understanding. Risks, benefits, and alternatives to the plan were reviewed in detail and all questions answered to the patient's satisfaction. In addition to preventive/wellness visit, 50 minutes were spent with patient, >50% of time based on counseling and coordination of care.          Follow up in about 4 weeks (around 9/17/2020) for lab results f/u, anxiety (VV).     20

## 2024-02-14 NOTE — TELEPHONE ENCOUNTER
Forwarded to Dr. Conway for review.     [facility] hosp f/u appt request     Chest pain, unspecified type (Primary Dx);  Acute electrocardiogram changes  Discharge Disposition: Discharged to home or self care (routine discharge     Next apt with you not until 4/3/2024 12:30 PM     Recent Data from [facility]  Related to EGFR  Component 02/13/24 02/12/24 02/11/24 02/10/24 11/05/23 11/04/23   EGFR (CKD-EPI_WITHOUT RACE FACTOR) 11.1  12.3  12.8  12.9  12.3  10.7        Recent Data from [facility]  Related to Basic Metabolic Panel  Component 02/13/24 02/12/24 02/11/24 02/10/24 11/05/23 11/04/23   GLUCOSE 203 High  180 High  156 High  222 High  165 High  204 High    UREA NITROGEN (BUN) 50 High  50 High  47 High  41 High  46 High  44 High    CREATININE 4.08 High  3.76 High  3.64 High  3.62 High  3.77 High  4.24 High    CALCIUM 8.2 Low  8.3 Low  8.2 Low  8.6 8.6 8.6   SODIUM 139 140 143 141 141 141   POTASSIUM 4.1 4.2 4.2 4.1 3.7 4.0   CHLORIDE 106 109 High  109 High  107 110 High  108 High    CO2 TOTAL 21 Low  21 Low  22 25 21 Low  23   BUN/CREAT RATIO 12 13 13 11 12 10   ANION GAP 12 10 12 9 10 10      Spoke to Mr. Meneses regarding over due labs. Mr Meneses reports that he has switched jobs and he now has new insurance  And can only use [employer] or pay full price for health/rx. He now has a new PCP but could not recall her name. Thanked him for using [facility] and no other concerns were voiced. JOCELINE